# Patient Record
Sex: FEMALE | Race: WHITE | NOT HISPANIC OR LATINO | Employment: UNEMPLOYED | URBAN - METROPOLITAN AREA
[De-identification: names, ages, dates, MRNs, and addresses within clinical notes are randomized per-mention and may not be internally consistent; named-entity substitution may affect disease eponyms.]

---

## 2017-02-15 ENCOUNTER — GENERIC CONVERSION - ENCOUNTER (OUTPATIENT)
Dept: OTHER | Facility: OTHER | Age: 47
End: 2017-02-15

## 2017-04-25 ENCOUNTER — GENERIC CONVERSION - ENCOUNTER (OUTPATIENT)
Dept: OTHER | Facility: OTHER | Age: 47
End: 2017-04-25

## 2017-04-25 ENCOUNTER — HOSPITAL ENCOUNTER (OUTPATIENT)
Dept: RADIOLOGY | Facility: CLINIC | Age: 47
Discharge: HOME | End: 2017-04-25

## 2017-04-25 DIAGNOSIS — M70.71 OTHER BURSITIS OF HIP, RIGHT HIP: ICD-10-CM

## 2017-04-25 DIAGNOSIS — M76.31 ILIOTIBIAL BAND SYNDROME OF RIGHT SIDE: ICD-10-CM

## 2017-04-25 DIAGNOSIS — M54.9 DORSALGIA: ICD-10-CM

## 2017-04-25 DIAGNOSIS — G57.01 LESION OF RIGHT SCIATIC NERVE: ICD-10-CM

## 2017-04-25 DIAGNOSIS — M53.3 SACROCOCCYGEAL DISORDERS, NOT ELSEWHERE CLASSIFIED: ICD-10-CM

## 2017-04-25 PROCEDURE — 73502 X-RAY EXAM HIP UNI 2-3 VIEWS: CPT

## 2017-04-25 PROCEDURE — 72100 X-RAY EXAM L-S SPINE 2/3 VWS: CPT

## 2017-05-09 ENCOUNTER — GENERIC CONVERSION - ENCOUNTER (OUTPATIENT)
Dept: OTHER | Facility: OTHER | Age: 47
End: 2017-05-09

## 2017-05-09 ENCOUNTER — APPOINTMENT (OUTPATIENT)
Dept: PHYSICAL THERAPY | Facility: REHABILITATION | Age: 47
End: 2017-05-09
Payer: COMMERCIAL

## 2017-05-09 DIAGNOSIS — G57.01 LESION OF RIGHT SCIATIC NERVE: ICD-10-CM

## 2017-05-09 DIAGNOSIS — M53.3 SACROCOCCYGEAL DISORDERS, NOT ELSEWHERE CLASSIFIED: ICD-10-CM

## 2017-05-09 DIAGNOSIS — M70.71 OTHER BURSITIS OF HIP, RIGHT HIP: ICD-10-CM

## 2017-05-09 DIAGNOSIS — M76.31 ILIOTIBIAL BAND SYNDROME OF RIGHT SIDE: ICD-10-CM

## 2017-05-09 PROCEDURE — 97162 PT EVAL MOD COMPLEX 30 MIN: CPT

## 2017-05-09 PROCEDURE — 97110 THERAPEUTIC EXERCISES: CPT

## 2017-05-11 ENCOUNTER — APPOINTMENT (OUTPATIENT)
Dept: PHYSICAL THERAPY | Facility: REHABILITATION | Age: 47
End: 2017-05-11
Payer: COMMERCIAL

## 2017-05-11 PROCEDURE — 97014 ELECTRIC STIMULATION THERAPY: CPT

## 2017-05-11 PROCEDURE — 97110 THERAPEUTIC EXERCISES: CPT

## 2017-05-11 PROCEDURE — 97140 MANUAL THERAPY 1/> REGIONS: CPT

## 2017-05-16 ENCOUNTER — APPOINTMENT (OUTPATIENT)
Dept: PHYSICAL THERAPY | Facility: REHABILITATION | Age: 47
End: 2017-05-16
Payer: COMMERCIAL

## 2017-05-16 PROCEDURE — 97110 THERAPEUTIC EXERCISES: CPT

## 2017-05-16 PROCEDURE — 97140 MANUAL THERAPY 1/> REGIONS: CPT

## 2017-05-16 PROCEDURE — 97014 ELECTRIC STIMULATION THERAPY: CPT

## 2017-05-18 ENCOUNTER — ALLSCRIPTS OFFICE VISIT (OUTPATIENT)
Dept: OTHER | Facility: OTHER | Age: 47
End: 2017-05-18

## 2017-05-23 ENCOUNTER — APPOINTMENT (OUTPATIENT)
Dept: PHYSICAL THERAPY | Facility: REHABILITATION | Age: 47
End: 2017-05-23
Payer: COMMERCIAL

## 2017-05-23 PROCEDURE — 97140 MANUAL THERAPY 1/> REGIONS: CPT

## 2017-05-23 PROCEDURE — 97110 THERAPEUTIC EXERCISES: CPT

## 2017-05-23 PROCEDURE — 97014 ELECTRIC STIMULATION THERAPY: CPT

## 2017-05-25 ENCOUNTER — APPOINTMENT (OUTPATIENT)
Dept: PHYSICAL THERAPY | Facility: REHABILITATION | Age: 47
End: 2017-05-25
Payer: COMMERCIAL

## 2017-05-25 PROCEDURE — 97014 ELECTRIC STIMULATION THERAPY: CPT

## 2017-05-25 PROCEDURE — 97140 MANUAL THERAPY 1/> REGIONS: CPT

## 2017-05-25 PROCEDURE — 97110 THERAPEUTIC EXERCISES: CPT

## 2017-05-30 ENCOUNTER — APPOINTMENT (OUTPATIENT)
Dept: PHYSICAL THERAPY | Facility: REHABILITATION | Age: 47
End: 2017-05-30
Payer: COMMERCIAL

## 2018-01-09 NOTE — PROGRESS NOTES
Assessment    1  Shoulder tendonitis (726 10) (M75 80)   2  Tendinitis of right shoulder (726 10) (M75 81)   3  Bursitis of hip (726 5) (M70 70)   4  Bursitis of right hip (726 5) (M70 71)    Plan  Bursitis of right hip, Tendinitis of right shoulder    · Meloxicam 7 5 MG Oral Tablet; TAKE 1 TABLET TWICE DAILY WITH FOOD    Discussion/Summary    Discussed diagnostic and treatment options with patient  Will hold off on x-rays at this time as no trauma involved  Patient be started on meloxicam 7 5 mg one twice a day with food and instructed to apply ice for 20 minutes 2-3 times daily  Recommend patient discontinue bowling for the next 2 weeks and allow these areas to rest  Patient return the office in 2 weeks or sooner when necessary  If symptoms persist discussed x-ray and referral to orthopedics and/or physical therapy  Possible side effects of new medications were reviewed with the patient/guardian today  The treatment plan was reviewed with the patient/guardian  The patient/guardian understands and agrees with the treatment plan      Chief Complaint    1  Arm Pain  Right Arm Pain  x 2 weeks      History of Present Illness  HPI: Patient started 2 weeks ago with right shoulder and right hip pain after bowling  Patient bowls weekly and complained of recurrent pain last week after bowling again  She denies any specific injury or fall  Patient treated this with Aleve without significant relief  Arm Pain:   Mari Mcmullen presents with complaints of intermittent episodes of right shoulder pain in the arms, described as aching, radiating to the right upper arm and right elbow  Associated symptoms include no swelling, no redness, no ecchymosis, no instability, no decreased range of motion, no numbness, no tingling and no weakness  Mari Mcmullen presents with complaints of shoulder symptoms  Hip Pain: The patient is being seen for an initial evaluation of hip pain   Symptoms:  no thigh pain, no localized bruising, no localized swelling, no localized redness, no localized warmth and no limping    The patient presents with complaints of occasional episodes of right hip pain, described as sharp, radiating to the right groin  The patient is currently experiencing symptoms  Associated symptoms:  no fever, no chills, no back pain and no rash  Active Problems    1  ACL tear (844 2) (S83 519A)   2  Acute sinusitis (461 9) (J01 90)   3  Allergic rhinitis (477 9) (J30 9)   4  Anxiety (300 00) (F41 9)   5  Cough (786 2) (R05)   6  Depression (311) (F32 9)   7  Esophageal reflux (530 81) (K21 9)   8  Headache (784 0) (R51)   9  Insect bite of leg (916 4,E906 4) (A46 667K,Q10  XXXA)   10  Joint pain, knee (719 46) (M25 569)   11  Neck pain (723 1) (M54 2)   12  Neck Strain (847 0)   13  Patellar tendonitis, unspecified laterality (726 64) (M76 50)   14  Sprained Anterior Cruciate Ligament Of The Knee (844 2)   15  Sprained Right Knee (844 9)   16  Tendinitis of shoulder, unspecified laterality (726 10) (M75 80)    Past Medical History    1  Diabetes Mellitus (250 00)    Family History    1  Family history of Diabetes Mellitus (V18 0)    2  Family history of Lung Cancer (V16 1)    3  Family history of Stroke Syndrome (V17 1)    4  Family history of Gastric Cancer (V16 0)    5  Family history of Lung Cancer (V16 1)    Social History    · Being A Social Drinker   · Daily Coffee Consumption (2  Cups/Day)   · Never A Smoker    Current Meds   1  ALPRAZolam ER 0 5 MG Oral Tablet Extended Release 24 Hour; TAKE 1 TABLET DAILY   AS DIRECTED; Therapy: 30Apr2013 to (Evaluate:06Dqo0816); Last Rx:30Apr2013 Ordered   2  Calcium 600 + D TABS; Take 1 tablet twice daily; Therapy: (Recorded:49Wgp3686) to Recorded   3  [facility] Vitamin D3 1000 UNIT CAPS; Therapy: (Mia Franks) to Recorded   4  Dymista 137-50 MCG/ACT Nasal Suspension; Therapy: [id number] to Recorded   5  Fish Oil 1200 MG Oral Capsule; TAKE AS DIRECTED;    Therapy: (Recorded:28Sjc1645) to Recorded   6  Pantoprazole Sodium 40 MG Oral Tablet Delayed Release; TAKE 1 TABLET TWICE   DAILY; Therapy: ([facility]) to Recorded   7  Sucralfate 1 GM Oral Tablet; TAKE TWICE A DAY AS DIRECTED; Therapy: ([facility]) to Recorded   8  Zenchent 0 4-35 MG-MCG Oral Tablet; TAKE 1 TABLET DAILY AS DIRECTED; Therapy: (Recorded:76Vcx1820) to Recorded    Allergies    1  No Known Drug Allergies    Vitals   Recorded: 33QES7022 05:08PM Recorded: 30NUS0346 04:52PM   Temperature  97 7 F   Heart Rate 76    Respiration 16    Systolic  856   Diastolic  90   Height  5 ft 1 in   Weight  188 lb    BMI Calculated  35 52   BSA Calculated  1 84     Physical Exam    Constitutional   General appearance: No acute distress, well appearing and well nourished  Eyes   Conjunctiva and lids: No swelling, erythema or discharge  Pulmonary   Respiratory effort: No increased work of breathing or signs of respiratory distress  Auscultation of lungs: Clear to auscultation  Cardiovascular   Auscultation of heart: Normal rate and rhythm, normal S1 and S2, without murmurs  Examination of extremities for edema and/or varicosities: Normal     Abdomen   Abdomen: Non-tender, no masses  Lymphatic   Palpation of lymph nodes in neck: No lymphadenopathy  Musculoskeletal   Gait and station: Normal     Inspection/palpation of joints, bones, and muscles: Abnormal   Right shoulder reveals full range of motion intact  Positive tenderness along the anterior aspect of the shoulder and along the biceps tendon  Right hip reveals full range of motion intact  Negative straight leg raise and negative Osmin tenderness  Positive tenderness along the lateral aspect of the hip at the greater trochanter  Skin   Skin and subcutaneous tissue: Normal without rashes or lesions      Psychiatric   Orientation to person, place, and time: Normal     Mood and affect: Normal          Signatures   Electronically signed by : DEBBIE Sahni DO; Jan 11 2016  5:18PM EST                       (Author)

## 2018-01-14 VITALS
BODY MASS INDEX: 34.55 KG/M2 | SYSTOLIC BLOOD PRESSURE: 125 MMHG | HEART RATE: 79 BPM | HEIGHT: 61 IN | DIASTOLIC BLOOD PRESSURE: 78 MMHG | WEIGHT: 183 LBS

## 2018-01-17 NOTE — PROGRESS NOTES
Assessment    1  Hypertension (401 9) (I10)   2  Tendinitis of right shoulder (726 10) (H39 81)    Plan  Hypertension    · Lisinopril 10 MG Oral Tablet; TAKE 1 TABLET DAILY  Tendinitis of right shoulder    · 1 Kamryn Carlin MD, Gladys Yun (Orthopedic Surgery) Physician Referral  Consult  Status:  Hold For - Scheduling  Requested for: [id number]  Care Summary provided  : Yes    Discussion/Summary    Discussed treatment options with patient  Patient will get labs as per gynecologist including thyroid studies  Patient restart on lisinopril 10 mg one daily  Patient instructed to follow a low-fat, low-salt and a low carbohydrate diet and get regular exercise walking 4-5 times a week for up to 30 minutes as tolerated  Patient to monitor blood pressure at home  Patient being referred to Dr Andrew Haley, orthopedic surgeon and instructed to discontinue meloxicam  Patient return the office in 4-6 weeks for blood pressure recheck  Possible side effects of new medications were reviewed with the patient/guardian today  The treatment plan was reviewed with the patient/guardian  The patient/guardian understands and agrees with the treatment plan      Chief Complaint  Elevated BP      History of Present Illness  HPI: Patient is concerned about elevated blood pressure at her gynecologist office 3 days ago and over the weekend at home in the range of 150/100  Patient denies any symptoms  She denies headache lightheaded or dizziness  Patient denies any chest pain, shortness of breath, palpitations or leg swelling  Patient admits to not exercising regularly and weight gain  Patient admits to family history of hypertension  Patient's blood work ordered by her gynecologist  Patient complains of continued right shoulder pain with no significant relief with meloxicam    Hypertension: The patient is being seen for an initial evaluation of essential hypertension   Symptoms:  no headache, no confusion, no visual disturbance, no dizziness and no shortness of breath  The patient is currently experiencing symptoms  Exacerbating factors:  weight gain, but not exacerbated by stress and not exacerbated by fatigue  Associated symptoms:  no chest pain, no claudication, no near syncope, no syncope, no weakness, no paresthesias and no edema  Active Problems    1  ACL tear (844 2) (S83 519A)   2  Acute sinusitis (461 9) (J01 90)   3  Allergic rhinitis (477 9) (J30 9)   4  Anxiety (300 00) (F41 9)   5  Bursitis of hip (726 5) (M70 70)   6  Bursitis of right hip (726 5) (M70 71)   7  Cough (786 2) (R05)   8  Depression (311) (F32 9)   9  Esophageal reflux (530 81) (K21 9)   10  Headache (784 0) (R51)   11  Insect bite of leg (916 4,E906 4) (S33 468S,C51  XXXA)   12  Joint pain, knee (719 46) (M25 569)   13  Neck pain (723 1) (M54 2)   14  Neck Strain (847 0)   15  Patellar tendonitis, unspecified laterality (726 64) (M76 50)   16  Shoulder tendonitis (726 10) (M75 80)   17  Sprained Anterior Cruciate Ligament Of The Knee (844 2)   18  Sprained Right Knee (844 9)   19  Tendinitis of right shoulder (726 10) (M75 81)   20  Tendinitis of shoulder, unspecified laterality (726 10) (M75 80)    Past Medical History    1  Diabetes Mellitus (250 00)    Family History    1  Family history of Diabetes Mellitus (V18 0)    2  Family history of Lung Cancer (V16 1)    3  Family history of Stroke Syndrome (V17 1)    4  Family history of Gastric Cancer (V16 0)    5  Family history of Lung Cancer (V16 1)    Social History    · Being A Social Drinker   · Daily Coffee Consumption (2  Cups/Day)   · Never A Smoker    Current Meds   1  ALPRAZolam ER 0 5 MG Oral Tablet Extended Release 24 Hour; TAKE 1 TABLET DAILY   AS DIRECTED; Therapy: 30Apr2013 to (Evaluate:03Pau1539); Last Rx:30Apr2013 Ordered   2  Calcium 600 + D TABS; Take 1 tablet twice daily; Therapy: (Recorded:88Eir9215) to Recorded   3  [employer] Vitamin D3 1000 UNIT CAPS; Therapy: (Naomi Robertson) to Recorded   4   [facility] 137-50 MCG/ACT Nasal Suspension; Therapy: [id number] to Recorded   5  Fish Oil 1200 MG Oral Capsule; TAKE AS DIRECTED; Therapy: (Recorded:21Beo7796) to Recorded   6  Meloxicam 7 5 MG Oral Tablet; TAKE 1 TABLET TWICE DAILY WITH FOOD; Therapy: 58RRD2269 to (Evaluate:43Qaz8264)  Requested for: 00ETO7882; Last   Rx:11Jan2016 Ordered   7  Pantoprazole Sodium 40 MG Oral Tablet Delayed Release; TAKE 1 TABLET TWICE   DAILY; Therapy: (Artur Jefferson) to Recorded   8  Sucralfate 1 GM Oral Tablet; TAKE TWICE A DAY AS DIRECTED; Therapy: (Recorded:28Jan2014) to Recorded    Allergies    1  No Known Drug Allergies    Vitals   Recorded: 20EQQ1715 05:33PM Recorded: 28DYD0633 05:09PM   Temperature  99 1 F   Heart Rate 76    Respiration 16    Systolic 517 244   Diastolic 98 140   Height  5 ft 1 in   Weight  187 lb    BMI Calculated  35 33   BSA Calculated  1 84     Physical Exam    Constitutional   General appearance: No acute distress, well appearing and well nourished  Eyes   Conjunctiva and lids: No swelling, erythema or discharge  Ears, Nose, Mouth, and Throat   External inspection of ears and nose: Normal     Otoscopic examination: Tympanic membranes translucent with normal light reflex  Canals patent without erythema  Nasal mucosa, septum, and turbinates: Normal without edema or erythema  Oropharynx: Normal with no erythema, edema, exudate or lesions  Pulmonary   Respiratory effort: No increased work of breathing or signs of respiratory distress  Auscultation of lungs: Clear to auscultation  Cardiovascular   Auscultation of heart: Normal rate and rhythm, normal S1 and S2, without murmurs  Examination of extremities for edema and/or varicosities: Normal     Carotid pulses: Normal     Abdomen   Abdomen: Non-tender, no masses  Lymphatic   Palpation of lymph nodes in neck: No lymphadenopathy  Negative thyroid tenderness     Musculoskeletal   Gait and station: Normal     Inspection/palpation of joints, bones, and muscles: Abnormal   Right shoulder range of motion intact  Positive tenderness along biceps tendon  Skin   Skin and subcutaneous tissue: Normal without rashes or lesions      Psychiatric   Orientation to person, place, and time: Normal     Mood and affect: Normal          Signatures   Electronically signed by : DEBBIE Guerin DO; Feb 1 2016  5:44PM EST                       (Author)

## 2018-01-22 VITALS
DIASTOLIC BLOOD PRESSURE: 73 MMHG | HEART RATE: 91 BPM | BODY MASS INDEX: 34.55 KG/M2 | WEIGHT: 183 LBS | SYSTOLIC BLOOD PRESSURE: 117 MMHG | HEIGHT: 61 IN

## 2018-02-16 PROCEDURE — 87624 HPV HI-RISK TYP POOLED RSLT: CPT | Performed by: OBSTETRICS & GYNECOLOGY

## 2018-02-16 PROCEDURE — G0145 SCR C/V CYTO,THINLAYER,RESCR: HCPCS | Performed by: OBSTETRICS & GYNECOLOGY

## 2018-02-17 ENCOUNTER — LAB REQUISITION (OUTPATIENT)
Dept: LAB | Facility: HOSPITAL | Age: 48
End: 2018-02-17
Payer: COMMERCIAL

## 2018-02-17 DIAGNOSIS — Z11.51 ENCOUNTER FOR SCREENING FOR HUMAN PAPILLOMAVIRUS (HPV): ICD-10-CM

## 2018-02-17 DIAGNOSIS — Z01.419 ENCOUNTER FOR GYNECOLOGICAL EXAMINATION WITHOUT ABNORMAL FINDING: ICD-10-CM

## 2018-02-19 LAB — HPV RRNA GENITAL QL NAA+PROBE: NORMAL

## 2018-02-22 LAB
LAB AP GYN PRIMARY INTERPRETATION: NORMAL
LAB AP LMP: NORMAL
Lab: NORMAL

## 2018-03-25 DIAGNOSIS — I10 ESSENTIAL HYPERTENSION: Primary | ICD-10-CM

## 2018-03-25 RX ORDER — AMLODIPINE BESYLATE 5 MG/1
TABLET ORAL
Qty: 90 TABLET | Refills: 0 | Status: SHIPPED | OUTPATIENT
Start: 2018-03-25 | End: 2018-03-26 | Stop reason: SDUPTHER

## 2018-03-26 DIAGNOSIS — I10 ESSENTIAL HYPERTENSION: ICD-10-CM

## 2018-03-26 RX ORDER — AMLODIPINE BESYLATE 5 MG/1
5 TABLET ORAL DAILY
Qty: 90 TABLET | Refills: 3 | Status: SHIPPED | OUTPATIENT
Start: 2018-03-26 | End: 2019-05-31 | Stop reason: SDUPTHER

## 2018-05-20 ENCOUNTER — OFFICE VISIT (OUTPATIENT)
Dept: URGENT CARE | Age: 48
End: 2018-05-20
Payer: COMMERCIAL

## 2018-05-20 VITALS
HEIGHT: 61 IN | HEART RATE: 83 BPM | RESPIRATION RATE: 16 BRPM | OXYGEN SATURATION: 96 % | DIASTOLIC BLOOD PRESSURE: 76 MMHG | WEIGHT: 177 LBS | BODY MASS INDEX: 33.42 KG/M2 | SYSTOLIC BLOOD PRESSURE: 122 MMHG | TEMPERATURE: 99.7 F

## 2018-05-20 DIAGNOSIS — J20.9 ACUTE BRONCHITIS, UNSPECIFIED ORGANISM: Primary | ICD-10-CM

## 2018-05-20 PROCEDURE — 99213 OFFICE O/P EST LOW 20 MIN: CPT | Performed by: PHYSICIAN ASSISTANT

## 2018-05-20 RX ORDER — SUCRALFATE 1 G/1
TABLET ORAL
COMMUNITY
End: 2020-12-01 | Stop reason: SDUPTHER

## 2018-05-20 RX ORDER — PANTOPRAZOLE SODIUM 40 MG/1
1 TABLET, DELAYED RELEASE ORAL 2 TIMES DAILY
COMMUNITY
End: 2020-09-11 | Stop reason: SDUPTHER

## 2018-05-20 RX ORDER — AMOXICILLIN 500 MG
CAPSULE ORAL
COMMUNITY

## 2018-05-20 RX ORDER — AZELASTINE HYDROCHLORIDE, FLUTICASONE PROPIONATE 137; 50 UG/1; UG/1
SPRAY, METERED NASAL
COMMUNITY
Start: 2013-12-05 | End: 2018-05-30

## 2018-05-20 RX ORDER — AZITHROMYCIN 250 MG/1
TABLET, FILM COATED ORAL
Qty: 6 TABLET | Refills: 0 | Status: SHIPPED | OUTPATIENT
Start: 2018-05-20 | End: 2018-05-24

## 2018-05-20 RX ORDER — NORETHINDRONE ACETATE AND ETHINYL ESTRADIOL, ETHINYL ESTRADIOL AND FERROUS FUMARATE 1MG-10(24)
KIT ORAL
COMMUNITY
Start: 2018-02-28 | End: 2019-03-08 | Stop reason: SDUPTHER

## 2018-05-20 RX ORDER — ALPRAZOLAM 0.5 MG/1
1 TABLET, EXTENDED RELEASE ORAL DAILY
COMMUNITY
Start: 2013-04-30 | End: 2018-11-26 | Stop reason: SDUPTHER

## 2018-05-20 RX ORDER — GUAIFENESIN 600 MG
1200 TABLET, EXTENDED RELEASE 12 HR ORAL EVERY 12 HOURS SCHEDULED
Qty: 8 TABLET | Refills: 0 | Status: SHIPPED | OUTPATIENT
Start: 2018-05-20 | End: 2018-05-30

## 2018-05-20 RX ORDER — ALBUTEROL SULFATE 90 UG/1
2 AEROSOL, METERED RESPIRATORY (INHALATION) EVERY 6 HOURS PRN
Qty: 1 INHALER | Refills: 0 | Status: SHIPPED | OUTPATIENT
Start: 2018-05-20 | End: 2018-11-14

## 2018-05-20 RX ORDER — B-COMPLEX WITH VITAMIN C
1 TABLET ORAL 2 TIMES DAILY
COMMUNITY

## 2018-05-20 NOTE — PATIENT INSTRUCTIONS
Take medications as prescribed  Go to family doctor if symptoms persist   Go to ER immediately if new or worsening symptoms occur  Acute Bronchitis   WHAT YOU NEED TO KNOW:   Acute bronchitis is swelling and irritation in the air passages of your lungs  This irritation may cause you to cough or have other breathing problems  Acute bronchitis often starts because of another illness, such as a cold or the flu  The illness spreads from your nose and throat to your windpipe and airways  Bronchitis is often called a chest cold  Acute bronchitis lasts about 3 to 6 weeks and is usually not a serious illness  Your cough can last for several weeks  DISCHARGE INSTRUCTIONS:   Return to the emergency department if:   · You cough up blood  · Your lips or fingernails turn blue  · You feel like you are not getting enough air when you breathe  Contact your healthcare provider if:   · You have a fever  · Your breathing problems do not go away or get worse  · Your cough does not get better within 4 weeks  · You have questions or concerns about your condition or care  Self-care:   · Get more rest   Rest helps your body to heal  Slowly start to do more each day  Rest when you feel it is needed  · Avoid irritants in the air  Avoid chemicals, fumes, and dust  Wear a face mask if you must work around dust or fumes  Stay inside on days when air pollution levels are high  If you have allergies, stay inside when pollen counts are high  Do not use aerosol products, such as spray-on deodorant, bug spray, and hair spray  · Do not smoke or be around others who smoke  Nicotine and other chemicals in cigarettes and cigars damages the cilia that move mucus out of your lungs  Ask your healthcare provider for information if you currently smoke and need help to quit  E-cigarettes or smokeless tobacco still contain nicotine  Talk to your healthcare provider before you use these products  · Drink liquids as directed  Liquids help keep your air passages moist and help you cough up mucus  You may need to drink more liquids when you have acute bronchitis  Ask how much liquid to drink each day and which liquids are best for you  · Use a humidifier or vaporizer  Use a cool mist humidifier or a vaporizer to increase air moisture in your home  This may make it easier for you to breathe and help decrease your cough  Decrease risk for acute bronchitis:   · Get the vaccinations you need  Ask your healthcare provider if you should get vaccinated against the flu or pneumonia  · Prevent the spread of germs  You can decrease your risk of acute bronchitis and other illnesses by doing the following:     [facility] your hands often with soap and water  Carry germ-killing hand lotion or gel with you  You can use the lotion or gel to clean your hands when soap and water are not available  ¨ Do not touch your eyes, nose, or mouth unless you have washed your hands first     ¨ Always cover your mouth when you cough to prevent the spread of germs  It is best to cough into a tissue or your shirt sleeve instead of into your hand  Ask those around you cover their mouths when they cough  ¨ Try to avoid people who have a cold or the flu  If you are sick, stay away from others as much as possible  Medicines: Your healthcare provider may  give you any of the following:  · Ibuprofen or acetaminophen  are medicines that help lower your fever  They are available without a doctor's order  Ask your healthcare provider which medicine is right for you  Ask how much to take and how often to take it  Follow directions  These medicines can cause stomach bleeding if not taken correctly  Ibuprofen can cause kidney damage  Do not take ibuprofen if you have kidney disease, an ulcer, or allergies to aspirin  Acetaminophen can cause liver damage  Do not take more than 4,000 milligrams in 24 hours       · Decongestants  help loosen mucus in your lungs and make it easier to cough up  This can help you breathe easier  · Cough suppressants  decrease your urge to cough  If your cough produces mucus, do not take a cough suppressant unless your healthcare provider tells you to  Your healthcare provider may suggest that you take a cough suppressant at night so you can rest     · Inhalers  may be given  Your healthcare provider may give you one or more inhalers to help you breathe easier and cough less  An inhaler gives your medicine to open your airways  Ask your healthcare provider to show you how to use your inhaler correctly  · Take your medicine as directed  Contact your healthcare provider if you think your medicine is not helping or if you have side effects  Tell him of her if you are allergic to any medicine  Keep a list of the medicines, vitamins, and herbs you take  Include the amounts, and when and why you take them  Bring the list or the pill bottles to follow-up visits  Carry your medicine list with you in case of an emergency  Follow up with your healthcare provider as directed:  Write down questions you have so you will remember to ask them during your follow-up visits  © 2017 [employer] Information is for End User's use only and may not be sold, redistributed or otherwise used for commercial purposes  All illustrations and images included in CareNotes® are the copyrighted property of [employer]  or Abhijeet Tarango  The above information is an  only  It is not intended as medical advice for individual conditions or treatments  Talk to your doctor, nurse or pharmacist before following any medical regimen to see if it is safe and effective for you

## 2018-05-20 NOTE — PROGRESS NOTES
[facility]        NAME: Ganesh Woods is a 49 y o  female  : 1970    MRN: [id number]  DATE: May 20, 2018  TIME: 9:55 AM    Assessment and Plan   Acute bronchitis, unspecified organism [J20 9]  1  Acute bronchitis, unspecified organism  azithromycin (ZITHROMAX) 250 mg tablet    guaiFENesin (MUCINEX) 600 mg 12 hr tablet    albuterol (PROVENTIL HFA) 90 mcg/act inhaler         Patient Instructions       Take medications as prescribed  Go to family doctor if symptoms persist   Go to ER immediately if new or worsening symptoms occur  Chief Complaint     Chief Complaint   Patient presents with    Cold Like Symptoms     Pt reports chest congestion x1 week  She reports a cough that began Wednesday  Denies fevers  History of Present Illness       Cough   This is a new problem  Episode onset: 6 days ago  The problem has been gradually worsening  The problem occurs constantly  The cough is productive of brown sputum  Associated symptoms include nasal congestion, postnasal drip and rhinorrhea  Pertinent negatives include no chest pain, chills, ear congestion, ear pain, fever, headaches, hemoptysis, myalgias, rash, sore throat, shortness of breath, sweats, weight loss or wheezing  Exacerbated by: Deep breaths  She has tried nothing for the symptoms  The treatment provided no relief  Review of Systems   Review of Systems   Constitutional: Negative for chills, diaphoresis, fatigue, fever and weight loss  HENT: Positive for postnasal drip and rhinorrhea  Negative for congestion, ear pain, sinus pain, sinus pressure, sore throat and voice change  Eyes: Negative  Respiratory: Positive for cough  Negative for hemoptysis, chest tightness, shortness of breath and wheezing  Cardiovascular: Negative for chest pain and palpitations  Gastrointestinal: Negative for diarrhea, nausea and vomiting  Endocrine: Negative  Genitourinary: Negative for dysuria     Musculoskeletal: Negative for back pain, myalgias and neck pain  Skin: Negative for pallor and rash  Allergic/Immunologic: Negative  Neurological: Negative for dizziness, syncope and headaches  Hematological: Negative  Psychiatric/Behavioral: Negative  Current Medications       Current Outpatient Prescriptions:     ALPRAZolam ER (XANAX XR) 0 5 MG 24 hr tablet, Take 1 tablet by mouth daily, Disp: , Rfl:     Azelastine-Fluticasone (DYMISTA) 137-50 MCG/ACT SUSP, into each nostril, Disp: , Rfl:     albuterol (PROVENTIL HFA) 90 mcg/act inhaler, Inhale 2 puffs every 6 (six) hours as needed for wheezing or shortness of breath, Disp: 1 Inhaler, Rfl: 0    amLODIPine (NORVASC) 5 mg tablet, Take 1 tablet (5 mg total) by mouth daily, Disp: 90 tablet, Rfl: 3    azithromycin (ZITHROMAX) 250 mg tablet, Take 2 tablets today then 1 tablet daily x 4 days, Disp: 6 tablet, Rfl: 0    Calcium Carbonate-Vitamin D (CALCIUM 600+D) 600-200 MG-UNIT TABS, Take 1 tablet by mouth 2 (two) times a day, Disp: , Rfl:     Cholecalciferol ([employer]S VITAMIN D3) 1000 units capsule, Take by mouth, Disp: , Rfl:     guaiFENesin (MUCINEX) 600 mg 12 hr tablet, Take 2 tablets (1,200 mg total) by mouth every 12 (twelve) hours, Disp: 8 tablet, Rfl: 0    LO LOESTRIN FE 1 MG-10 MCG / 10 MCG TABS, , Disp: , Rfl:     Omega-3 Fatty Acids (FISH OIL) 1200 MG CAPS, Take by mouth, Disp: , Rfl:     pantoprazole (PROTONIX) 40 mg tablet, Take 1 tablet by mouth 2 (two) times a day, Disp: , Rfl:     sucralfate (CARAFATE) 1 g tablet, Take by mouth, Disp: , Rfl:     Current Allergies     Allergies as of 05/20/2018    (No Known Allergies)            The following portions of the patient's history were reviewed and updated as appropriate: allergies, current medications, past family history, past medical history, past social history, past surgical history and problem list      No past medical history on file  No past surgical history on file      No family history on file       Medications have been verified  Objective   /76   Pulse 83   Temp 99 7 °F (37 6 °C)   Resp 16   Ht 5' 1" (1 549 m)   Wt 80 3 kg (177 lb)   SpO2 96%   BMI 33 44 kg/m²        Physical Exam     Physical Exam   Constitutional: She is oriented to person, place, and time  She appears well-developed and well-nourished  No distress  HENT:   Head: Normocephalic and atraumatic  Right Ear: External ear normal    Left Ear: External ear normal    Mouth/Throat: No oropharyngeal exudate  Clear nasal dc b/l   PND  Erythematous posterior pharynx   Eyes: Conjunctivae and EOM are normal  Pupils are equal, round, and reactive to light  Right eye exhibits no discharge  Left eye exhibits no discharge  No scleral icterus  Neck: Normal range of motion  Neck supple  Cardiovascular: Normal rate, regular rhythm, normal heart sounds and intact distal pulses  Pulmonary/Chest: Effort normal and breath sounds normal  No respiratory distress  She has no wheezes  She has no rales  CTAB  Frequent dry cough   Musculoskeletal: She exhibits no edema or deformity  Neurological: She is alert and oriented to person, place, and time  Skin: Skin is warm  No rash noted  She is not diaphoretic  Nursing note and vitals reviewed

## 2018-05-30 ENCOUNTER — OFFICE VISIT (OUTPATIENT)
Dept: FAMILY MEDICINE CLINIC | Facility: CLINIC | Age: 48
End: 2018-05-30
Payer: COMMERCIAL

## 2018-05-30 VITALS
WEIGHT: 182 LBS | TEMPERATURE: 98.5 F | HEART RATE: 80 BPM | HEIGHT: 61 IN | OXYGEN SATURATION: 95 % | RESPIRATION RATE: 16 BRPM | SYSTOLIC BLOOD PRESSURE: 126 MMHG | DIASTOLIC BLOOD PRESSURE: 86 MMHG | BODY MASS INDEX: 34.36 KG/M2

## 2018-05-30 DIAGNOSIS — J20.9 ACUTE BRONCHITIS, UNSPECIFIED ORGANISM: Primary | ICD-10-CM

## 2018-05-30 PROCEDURE — 3008F BODY MASS INDEX DOCD: CPT | Performed by: FAMILY MEDICINE

## 2018-05-30 PROCEDURE — 99213 OFFICE O/P EST LOW 20 MIN: CPT | Performed by: FAMILY MEDICINE

## 2018-05-30 RX ORDER — LEVOFLOXACIN 500 MG/1
500 TABLET, FILM COATED ORAL EVERY 24 HOURS
Qty: 7 TABLET | Refills: 0 | Status: SHIPPED | OUTPATIENT
Start: 2018-05-30 | End: 2018-06-06

## 2018-05-30 RX ORDER — FLUTICASONE PROPIONATE 50 MCG
SPRAY, SUSPENSION (ML) NASAL DAILY
COMMUNITY

## 2018-05-30 NOTE — PROGRESS NOTES
Assessment/Plan:    Discussed diagnostic and treatment options with patient  Patient is being sent for chest x-ray PA and lateral, will heed results  Patient will be started on Levaquin 500 mg 1 daily for 7 days and Symbicort inhaler 160/4 5 mcg 2 puffs b i d  then rinse mouth  Patient instructed to take Mucinex DM p r n , increase fluids and rest   Return to the office in 1 week or sooner p r n  Honey Lewis Diagnoses and all orders for this visit:    Acute bronchitis, unspecified organism  Comments:  Chest x-ray  Levaquin 500 mg daily for 7 days  Symbicort inhaler 160/4 5 mcg 2 puffs b i d   Mucinex DM  Increase fluids and rest   Orders:  -     XR chest pa & lateral; Future  -     levofloxacin (LEVAQUIN) 500 mg tablet; Take 1 tablet (500 mg total) by mouth every 24 hours for 7 days    Other orders  -     fluticasone (FLONASE) 50 mcg/act nasal spray; into each nostril daily          Subjective:      Patient ID: Aftab Ozuna is a 49 y o  female  Patient is being seen in follow-up from an urgent care visit 10 days ago for bronchitis  Patient was treated with a Z-Mitul and Proventil inhaler without significant improvement  Patient states she developed a cough 2-3 weeks ago slightly productive of whitish mucus and occasional wheezing especially at night  Patient denies fever  URI    This is a new problem  The current episode started 1 to 4 weeks ago  There has been no fever  Associated symptoms include coughing, sneezing and wheezing  Pertinent negatives include no chest pain, congestion, ear pain, headaches, plugged ear sensation, rhinorrhea, sinus pain or sore throat  She has tried inhaler use for the symptoms  The treatment provided no relief         The following portions of the patient's history were reviewed and updated as appropriate: allergies, current medications, past family history, past medical history, past social history, past surgical history and problem list     Review of Systems   HENT: Positive for sneezing  Negative for congestion, ear pain, rhinorrhea, sinus pain and sore throat  Respiratory: Positive for cough and wheezing  Cardiovascular: Negative for chest pain  Neurological: Negative for headaches  Objective:      /86   Pulse 80   Temp 98 5 °F (36 9 °C)   Resp 16   Ht 5' 1" (1 549 m)   Wt 82 6 kg (182 lb)   SpO2 95%   BMI 34 39 kg/m²          Physical Exam   Constitutional: She is oriented to person, place, and time  She appears well-nourished  No distress  HENT:   Head: Normocephalic  Right Ear: External ear normal    Left Ear: External ear normal    Positive turbinates swelling with mucoid drainage  Throat with postnasal drainage  Eyes: Conjunctivae are normal  No scleral icterus  Neck: Neck supple  Cardiovascular: Normal rate and regular rhythm  Pulmonary/Chest: Effort normal and breath sounds normal  She has no wheezes  Abdominal: Soft  There is no tenderness  Musculoskeletal: She exhibits no edema  Lymphadenopathy:     She has no cervical adenopathy  Neurological: She is alert and oriented to person, place, and time  Skin: Skin is warm and dry  Psychiatric: She has a normal mood and affect

## 2018-05-31 ENCOUNTER — APPOINTMENT (OUTPATIENT)
Dept: RADIOLOGY | Age: 48
End: 2018-05-31
Payer: COMMERCIAL

## 2018-05-31 DIAGNOSIS — J20.9 ACUTE BRONCHITIS, UNSPECIFIED ORGANISM: ICD-10-CM

## 2018-05-31 PROCEDURE — 71046 X-RAY EXAM CHEST 2 VIEWS: CPT

## 2018-11-14 ENCOUNTER — OFFICE VISIT (OUTPATIENT)
Dept: FAMILY MEDICINE CLINIC | Facility: CLINIC | Age: 48
End: 2018-11-14
Payer: COMMERCIAL

## 2018-11-14 VITALS
TEMPERATURE: 98.6 F | BODY MASS INDEX: 35.3 KG/M2 | DIASTOLIC BLOOD PRESSURE: 70 MMHG | WEIGHT: 187 LBS | SYSTOLIC BLOOD PRESSURE: 104 MMHG | HEIGHT: 61 IN | RESPIRATION RATE: 16 BRPM | HEART RATE: 72 BPM

## 2018-11-14 DIAGNOSIS — J01.10 ACUTE FRONTAL SINUSITIS, RECURRENCE NOT SPECIFIED: Primary | ICD-10-CM

## 2018-11-14 PROCEDURE — 99213 OFFICE O/P EST LOW 20 MIN: CPT | Performed by: FAMILY MEDICINE

## 2018-11-14 RX ORDER — CEFUROXIME AXETIL 250 MG/1
250 TABLET ORAL EVERY 12 HOURS SCHEDULED
Qty: 20 TABLET | Refills: 0 | Status: SHIPPED | OUTPATIENT
Start: 2018-11-14 | End: 2018-11-24

## 2018-11-14 NOTE — PROGRESS NOTES
Assessment/Plan:    Patient will be started on Ceftin 250 mg b i d  for 10 days and continue Robitussin DM or Mucinex DM p r n   Increase fluids and rest   Return to the office in 1 week or sooner p r n  Maddi Schulz Diagnoses and all orders for this visit:    Acute frontal sinusitis, recurrence not specified  Comments:  Ceftin 250 mg 1 b i d  for 10 days  Robitussin DM or Mucinex DM p r n   Increase fluids and rest   Orders:  -     cefuroxime (CEFTIN) 250 mg tablet; Take 1 tablet (250 mg total) by mouth every 12 (twelve) hours for 10 days          Subjective:      Patient ID: Anita Born is a 51 y o  female  Patient started 3 weeks ago with sinus problems  She treated this with Tylenol and decongestant with some improvement until the past week complains of increased nasal congestion productive of yellow mucus, postnasal drainage and cough  She admits to sinus pressure headache  Patient denies fever  Sinus Problem   This is a new problem  The current episode started 1 to 4 weeks ago  The problem has been gradually worsening since onset  There has been no fever  Associated symptoms include congestion, coughing, headaches, a hoarse voice, sinus pressure and a sore throat  Pertinent negatives include no chills, ear pain, shortness of breath or sneezing  Past treatments include acetaminophen and oral decongestants (mucinex DM)  The treatment provided mild relief  The following portions of the patient's history were reviewed and updated as appropriate: allergies, current medications, past family history, past medical history, past social history, past surgical history and problem list     Review of Systems   Constitutional: Negative for chills  HENT: Positive for congestion, hoarse voice, sinus pressure and sore throat  Negative for ear pain and sneezing  Respiratory: Positive for cough  Negative for shortness of breath  Neurological: Positive for headaches           Objective:      /70 (BP Location: Left arm, Patient Position: Sitting, Cuff Size: Standard)   Pulse 72   Temp 98 6 °F (37 °C) (Tympanic)   Resp 16   Ht 5' 1" (1 549 m)   Wt 84 8 kg (187 lb)   BMI 35 33 kg/m²          Physical Exam   Constitutional: She is oriented to person, place, and time  She appears well-developed and well-nourished  HENT:   Head: Normocephalic  Right Ear: External ear normal    Left Ear: External ear normal    Positive turbinates swelling with mucoid drainage  Throat postnasal drainage and injected  Mucous membranes moist    Eyes: Conjunctivae are normal  No scleral icterus  Neck: Neck supple  Cardiovascular: Normal rate and regular rhythm  Pulmonary/Chest: Effort normal and breath sounds normal  She has no wheezes  Abdominal: Soft  There is no tenderness  Musculoskeletal: She exhibits no edema  Lymphadenopathy:     She has no cervical adenopathy  Neurological: She is alert and oriented to person, place, and time  Skin: Skin is warm and dry  Psychiatric: She has a normal mood and affect

## 2018-11-26 ENCOUNTER — OFFICE VISIT (OUTPATIENT)
Dept: FAMILY MEDICINE CLINIC | Facility: CLINIC | Age: 48
End: 2018-11-26
Payer: COMMERCIAL

## 2018-11-26 VITALS
BODY MASS INDEX: 35.12 KG/M2 | HEART RATE: 72 BPM | WEIGHT: 186 LBS | TEMPERATURE: 97.6 F | RESPIRATION RATE: 16 BRPM | SYSTOLIC BLOOD PRESSURE: 124 MMHG | HEIGHT: 61 IN | DIASTOLIC BLOOD PRESSURE: 82 MMHG

## 2018-11-26 DIAGNOSIS — F41.9 ANXIETY: ICD-10-CM

## 2018-11-26 DIAGNOSIS — J01.10 ACUTE FRONTAL SINUSITIS, RECURRENCE NOT SPECIFIED: Primary | ICD-10-CM

## 2018-11-26 PROCEDURE — 1036F TOBACCO NON-USER: CPT | Performed by: FAMILY MEDICINE

## 2018-11-26 PROCEDURE — 3008F BODY MASS INDEX DOCD: CPT | Performed by: FAMILY MEDICINE

## 2018-11-26 PROCEDURE — 99213 OFFICE O/P EST LOW 20 MIN: CPT | Performed by: FAMILY MEDICINE

## 2018-11-26 RX ORDER — ALPRAZOLAM 0.5 MG/1
0.5 TABLET, EXTENDED RELEASE ORAL DAILY PRN
Qty: 30 TABLET | Refills: 0 | Status: SHIPPED | OUTPATIENT
Start: 2018-11-26

## 2018-11-26 RX ORDER — AZITHROMYCIN 250 MG/1
TABLET, FILM COATED ORAL
Qty: 6 TABLET | Refills: 0 | Status: SHIPPED | OUTPATIENT
Start: 2018-11-26 | End: 2018-11-30

## 2018-11-26 NOTE — PROGRESS NOTES
Assessment/Plan:    Patient will be started on a Z-Mitul and recommend Robitussin DM or Mucinex DM p r n   Increase fluids and rest   Return to the office in 1 week or sooner p r n  Celestino Young Diagnoses and all orders for this visit:    Acute frontal sinusitis, recurrence not specified  Comments:  Z-Mitul and Mucinex or Robitussin p r n   Increase fluids and rest   Orders:  -     azithromycin (ZITHROMAX) 250 mg tablet; Take 2 tablets today then 1 tablet daily x 4 days    Anxiety  -     ALPRAZolam ER (XANAX XR) 0 5 MG 24 hr tablet; Take 1 tablet (0 5 mg total) by mouth daily as needed for anxiety          Subjective:      Patient ID: Harvey Wharton is a 51 y o  female  Patient recently completed a 10 day course of Ceftin for sinusitis with improvement in her symptoms until yesterday complains of recurrent sinus pressure headache, nasal congestion, postnasal drainage and cough  She denies fever  No treatment by patient  URI    This is a recurrent problem  The current episode started yesterday  The problem has been gradually worsening  There has been no fever  Associated symptoms include congestion, coughing, ear pain, headaches, rhinorrhea, sinus pain and sneezing  Pertinent negatives include no plugged ear sensation, sore throat or wheezing  Associated symptoms comments: pnd    She has tried increased fluids for the symptoms  The following portions of the patient's history were reviewed and updated as appropriate: allergies, current medications, past family history, past medical history, past social history, past surgical history and problem list     Review of Systems   HENT: Positive for congestion, ear pain, rhinorrhea, sinus pain and sneezing  Negative for sore throat  Respiratory: Positive for cough  Negative for wheezing  Neurological: Positive for headaches           Objective:      /82 (BP Location: Left arm, Patient Position: Sitting, Cuff Size: Standard)   Pulse 72   Temp 97 6 °F (36 4 °C) (Tympanic)   Resp 16   Ht 5' 1" (1 549 m)   Wt 84 4 kg (186 lb)   BMI 35 14 kg/m²          Physical Exam   Constitutional: She is oriented to person, place, and time  She appears well-developed and well-nourished  No distress  HENT:   Head: Normocephalic  Right Ear: External ear normal    Left Ear: External ear normal    Positive turbinates swelling with mucoid drainage  Throat postnasal drainage  Mucous membranes moist    Eyes: Conjunctivae are normal  No scleral icterus  Neck: Neck supple  Cardiovascular: Normal rate and regular rhythm  Pulmonary/Chest: Effort normal and breath sounds normal    Abdominal: Soft  There is no tenderness  Musculoskeletal: She exhibits no edema  Lymphadenopathy:     She has no cervical adenopathy  Neurological: She is alert and oriented to person, place, and time  Skin: Skin is warm and dry  Psychiatric: She has a normal mood and affect

## 2019-03-08 ENCOUNTER — ANNUAL EXAM (OUTPATIENT)
Dept: GYNECOLOGY | Facility: CLINIC | Age: 49
End: 2019-03-08
Payer: COMMERCIAL

## 2019-03-08 VITALS
SYSTOLIC BLOOD PRESSURE: 122 MMHG | WEIGHT: 191 LBS | RESPIRATION RATE: 17 BRPM | DIASTOLIC BLOOD PRESSURE: 74 MMHG | BODY MASS INDEX: 35.15 KG/M2 | TEMPERATURE: 98.3 F | HEIGHT: 62 IN

## 2019-03-08 DIAGNOSIS — Z01.419 ENCOUNTER FOR GYNECOLOGICAL EXAMINATION (GENERAL) (ROUTINE) WITHOUT ABNORMAL FINDINGS: Primary | ICD-10-CM

## 2019-03-08 DIAGNOSIS — Z12.31 ENCOUNTER FOR SCREENING MAMMOGRAM FOR MALIGNANT NEOPLASM OF BREAST: ICD-10-CM

## 2019-03-08 DIAGNOSIS — Z30.41 SURVEILLANCE OF CONTRACEPTIVE PILL: ICD-10-CM

## 2019-03-08 PROCEDURE — 99396 PREV VISIT EST AGE 40-64: CPT | Performed by: OBSTETRICS & GYNECOLOGY

## 2019-03-08 RX ORDER — NORETHINDRONE ACETATE AND ETHINYL ESTRADIOL, ETHINYL ESTRADIOL AND FERROUS FUMARATE 1MG-10(24)
1 KIT ORAL DAILY
Qty: 84 TABLET | Refills: 4 | Status: SHIPPED | OUTPATIENT
Start: 2019-03-08 | End: 2020-06-18 | Stop reason: SDUPTHER

## 2019-03-08 NOTE — PROGRESS NOTES
Assessment/Plan:       Diagnoses and all orders for this visit:    Encounter for gynecological examination (general) (routine) without abnormal findings    Encounter for screening mammogram for malignant neoplasm of breast  -     Mammo screening bilateral w 3d & cad; Future    Surveillance of contraceptive pill  -     LO LOESTRIN FE 1 MG-10 MCG / 10 MCG TABS; Take 1 tablet by mouth daily          Subjective:      Patient ID: Ken Fuentes is a 51 y o  female  The patient is a 53-year-old who presents for an annual gyn exam   She restarted oral contraceptives a year ago and has been amenorrheic on Lo Loestrin  She is happy with the amenorrhea  She would like to continue the birth control pills  She is not a smoker  She denies any breast or bladder problems  She has no dyspareunia  The patient had a normal Pap and HPV last year, therefore 1 will not be performed this year  The following portions of the patient's history were reviewed and updated as appropriate: allergies, current medications, past family history, past medical history, past social history, past surgical history and problem list     Review of Systems   Constitutional: Negative  Respiratory: Negative for shortness of breath  Cardiovascular: Negative for chest pain  Gastrointestinal: Negative  Genitourinary: Negative  Skin: Negative  Psychiatric/Behavioral: Negative for agitation, behavioral problems and confusion  Objective:      /74 (BP Location: Right arm, Patient Position: Sitting, Cuff Size: Standard)   Temp 98 3 °F (36 8 °C) (Tympanic)   Resp 17   Ht 5' 2" (1 575 m)   Wt 86 6 kg (191 lb)   BMI 34 93 kg/m²          Physical Exam   Constitutional: She appears well-developed and well-nourished  No distress  HENT:   Head: Normocephalic  Pulmonary/Chest: Effort normal  No respiratory distress  Right breast exhibits no inverted nipple, no mass, no nipple discharge, no skin change and no tenderness  Left breast exhibits no inverted nipple, no mass, no nipple discharge, no skin change and no tenderness  Breasts are symmetrical    Abdominal: She exhibits no distension and no mass  There is no tenderness  There is no rebound and no guarding  Genitourinary: Rectum normal and uterus normal  No labial fusion  There is no rash, tenderness, lesion or injury on the right labia  There is no rash, tenderness, lesion or injury on the left labia  Uterus is not tender  Cervix exhibits no motion tenderness, no discharge and no friability  Right adnexum displays no mass, no tenderness and no fullness  Left adnexum displays no mass, no tenderness and no fullness  No erythema, tenderness or bleeding in the vagina  No foreign body in the vagina  No signs of injury around the vagina  No vaginal discharge found  Lymphadenopathy:        Right axillary: No pectoral and no lateral adenopathy present  Left axillary: No pectoral and no lateral adenopathy present  Skin: Skin is warm, dry and intact  She is not diaphoretic  No cyanosis  Nails show no clubbing  Psychiatric: She has a normal mood and affect   Her speech is normal and behavior is normal

## 2019-03-11 DIAGNOSIS — Z12.31 ENCOUNTER FOR SCREENING MAMMOGRAM FOR MALIGNANT NEOPLASM OF BREAST: ICD-10-CM

## 2019-05-31 DIAGNOSIS — I10 ESSENTIAL HYPERTENSION: ICD-10-CM

## 2019-05-31 RX ORDER — AMLODIPINE BESYLATE 5 MG/1
TABLET ORAL
Qty: 90 TABLET | Refills: 3 | Status: SHIPPED | OUTPATIENT
Start: 2019-05-31 | End: 2020-05-14

## 2019-07-08 ENCOUNTER — OFFICE VISIT (OUTPATIENT)
Dept: URGENT CARE | Age: 49
End: 2019-07-08
Payer: COMMERCIAL

## 2019-07-08 VITALS
SYSTOLIC BLOOD PRESSURE: 132 MMHG | TEMPERATURE: 98.4 F | WEIGHT: 196 LBS | BODY MASS INDEX: 36.07 KG/M2 | HEIGHT: 62 IN | RESPIRATION RATE: 18 BRPM | DIASTOLIC BLOOD PRESSURE: 90 MMHG | HEART RATE: 92 BPM | OXYGEN SATURATION: 97 %

## 2019-07-08 DIAGNOSIS — J02.9 PHARYNGITIS, UNSPECIFIED ETIOLOGY: Primary | ICD-10-CM

## 2019-07-08 LAB — S PYO AG THROAT QL: NEGATIVE

## 2019-07-08 PROCEDURE — 87070 CULTURE OTHR SPECIMN AEROBIC: CPT

## 2019-07-08 PROCEDURE — 99213 OFFICE O/P EST LOW 20 MIN: CPT | Performed by: PHYSICIAN ASSISTANT

## 2019-07-08 PROCEDURE — 87880 STREP A ASSAY W/OPTIC: CPT

## 2019-07-08 RX ORDER — METHYLPREDNISOLONE 4 MG/1
TABLET ORAL
Qty: 1 EACH | Refills: 0 | Status: SHIPPED | OUTPATIENT
Start: 2019-07-08 | End: 2019-12-06

## 2019-07-08 NOTE — PATIENT INSTRUCTIONS
Take medrol dose abdirahman with food to prevent gi upset   May use tylenol as well  Recommended salt water gargles  Will call if culture comes back positive

## 2019-07-10 LAB — BACTERIA THROAT CULT: NORMAL

## 2019-12-06 ENCOUNTER — OFFICE VISIT (OUTPATIENT)
Dept: URGENT CARE | Age: 49
End: 2019-12-06

## 2019-12-06 VITALS
BODY MASS INDEX: 37.54 KG/M2 | HEART RATE: 84 BPM | SYSTOLIC BLOOD PRESSURE: 124 MMHG | DIASTOLIC BLOOD PRESSURE: 80 MMHG | OXYGEN SATURATION: 98 % | WEIGHT: 204 LBS | RESPIRATION RATE: 18 BRPM | HEIGHT: 62 IN | TEMPERATURE: 97.7 F

## 2019-12-06 DIAGNOSIS — J40 BRONCHITIS: Primary | ICD-10-CM

## 2019-12-06 RX ORDER — BENZONATATE 100 MG/1
100 CAPSULE ORAL 3 TIMES DAILY PRN
Qty: 20 CAPSULE | Refills: 0 | Status: SHIPPED | OUTPATIENT
Start: 2019-12-06 | End: 2020-08-04 | Stop reason: ALTCHOICE

## 2019-12-06 RX ORDER — ALBUTEROL SULFATE 90 UG/1
2 AEROSOL, METERED RESPIRATORY (INHALATION) EVERY 6 HOURS PRN
Qty: 8.5 G | Refills: 0 | Status: SHIPPED | OUTPATIENT
Start: 2019-12-06

## 2019-12-06 RX ORDER — AZITHROMYCIN 250 MG/1
TABLET, FILM COATED ORAL
Qty: 6 TABLET | Refills: 0 | Status: SHIPPED | OUTPATIENT
Start: 2019-12-06 | End: 2019-12-10

## 2019-12-06 NOTE — PROGRESS NOTES
[facility]        NAME: Ilene Jones is a 47 y o  female  : 1970    MRN: [id number]  DATE: 2019  TIME: 4:22 PM    /80   Pulse 84   Temp 97 7 °F (36 5 °C) (Tympanic)   Resp 18   Ht 5' 1 5" (1 562 m)   Wt 92 5 kg (204 lb)   SpO2 98%   BMI 37 92 kg/m²     Assessment and Plan   Bronchitis [J40]  1  Bronchitis  benzonatate (TESSALON PERLES) 100 mg capsule    albuterol (PROAIR HFA) 90 mcg/act inhaler    azithromycin (ZITHROMAX) 250 mg tablet         Patient Instructions       Follow up with PCP in 3-5 days  Proceed to  ER if symptoms worsen  Chief Complaint     Chief Complaint   Patient presents with    Cough     Pt c/o cough and congestion for 2 weeks  Denies fever  Pt has been taking Robitussin for symptoms  History of Present Illness       Pt with cough and congestion for 1 week    Cough   This is a new problem  The current episode started 1 to 4 weeks ago  The problem has been unchanged  The cough is non-productive  Pertinent negatives include no chest pain, chills, ear congestion, ear pain, fever, headaches, heartburn, hemoptysis, myalgias, nasal congestion, postnasal drip, rash, rhinorrhea, sore throat, shortness of breath, sweats, weight loss or wheezing  Nothing aggravates the symptoms  She has tried nothing for the symptoms  The treatment provided no relief  There is no history of asthma, bronchiectasis, bronchitis, COPD, emphysema, environmental allergies or pneumonia  Review of Systems   Review of Systems   Constitutional: Negative  Negative for chills, fever and weight loss  HENT: Negative  Negative for ear pain, postnasal drip, rhinorrhea and sore throat  Eyes: Negative  Respiratory: Positive for cough  Negative for hemoptysis, shortness of breath and wheezing  Cardiovascular: Negative  Negative for chest pain  Gastrointestinal: Negative  Negative for heartburn  Endocrine: Negative  Genitourinary: Negative  Musculoskeletal: Negative  Negative for myalgias  Skin: Negative  Negative for rash  Allergic/Immunologic: Negative  Negative for environmental allergies  Neurological: Negative  Negative for headaches  Hematological: Negative  Psychiatric/Behavioral: Negative  All other systems reviewed and are negative          Current Medications       Current Outpatient Medications:     albuterol (PROAIR HFA) 90 mcg/act inhaler, Inhale 2 puffs every 6 (six) hours as needed for wheezing, Disp: 8 5 g, Rfl: 0    ALPRAZolam ER (XANAX XR) 0 5 MG 24 hr tablet, Take 1 tablet (0 5 mg total) by mouth daily as needed for anxiety, Disp: 30 tablet, Rfl: 0    amLODIPine (NORVASC) 5 mg tablet, TAKE 1 TABLET DAILY, Disp: 90 tablet, Rfl: 3    azithromycin (ZITHROMAX) 250 mg tablet, Take 2 tablets today then 1 tablet daily x 4 days, Disp: 6 tablet, Rfl: 0    benzonatate (TESSALON PERLES) 100 mg capsule, Take 1 capsule (100 mg total) by mouth 3 (three) times a day as needed for cough, Disp: 20 capsule, Rfl: 0    Calcium Carbonate-Vitamin D (CALCIUM 600+D) 600-200 MG-UNIT TABS, Take 1 tablet by mouth 2 (two) times a day, Disp: , Rfl:     Cholecalciferol ([employer] VITAMIN D3) 1000 units capsule, Take by mouth, Disp: , Rfl:     fluticasone (FLONASE) 50 mcg/act nasal spray, into each nostril daily, Disp: , Rfl:     LO LOESTRIN FE 1 MG-10 MCG / 10 MCG TABS, Take 1 tablet by mouth daily, Disp: 84 tablet, Rfl: 4    Omega-3 Fatty Acids (FISH OIL) 1200 MG CAPS, Take by mouth, Disp: , Rfl:     pantoprazole (PROTONIX) 40 mg tablet, Take 1 tablet by mouth 2 (two) times a day, Disp: , Rfl:     sucralfate (CARAFATE) 1 g tablet, Take by mouth, Disp: , Rfl:     Current Allergies     Allergies as of 12/06/2019 - Reviewed 12/06/2019   Allergen Reaction Noted    Gluten meal  02/28/2018            The following portions of the patient's history were reviewed and updated as appropriate: allergies, current medications, past family history, past medical history, past social history, past surgical history and problem list      Past Medical History:   Diagnosis Date    Diabetes mellitus ([facility] [address] ) 02/2011    PRE DM, DIET CONTROLLED       Past Surgical History:   Procedure Laterality Date    KNEE ARTHROSCOPY W/ ACL RECONSTRUCTION AND EPIPHYSEAL HAMSTRING GRAFT  2013       Family History   Problem Relation Age of Onset    Diabetes Sister         Pre DM    Lung cancer Maternal Grandmother     Cancer Maternal Grandfather         Gastric    Stroke Paternal Grandmother         Syndrome    Lung cancer Paternal Grandfather          Medications have been verified  Objective   /80   Pulse 84   Temp 97 7 °F (36 5 °C) (Tympanic)   Resp 18   Ht 5' 1 5" (1 562 m)   Wt 92 5 kg (204 lb)   SpO2 98%   BMI 37 92 kg/m²        Physical Exam     Physical Exam   Constitutional: She is oriented to person, place, and time  She appears well-developed and well-nourished  HENT:   Head: Normocephalic  Right Ear: External ear normal    Left Ear: External ear normal    Nose: Nose normal    Mouth/Throat: Oropharynx is clear and moist    Eyes: Pupils are equal, round, and reactive to light  Conjunctivae and EOM are normal    Neck: Normal range of motion  Neck supple  Cardiovascular: Normal rate, regular rhythm and normal heart sounds  Pulmonary/Chest: Effort normal    Minor coarse joanne nds    Abdominal: Soft  Bowel sounds are normal    Musculoskeletal: Normal range of motion  Neurological: She is alert and oriented to person, place, and time  Skin: Skin is warm  Capillary refill takes less than 2 seconds  Psychiatric: She has a normal mood and affect  Her behavior is normal    Nursing note and vitals reviewed

## 2019-12-06 NOTE — PATIENT INSTRUCTIONS
Acute Bronchitis   WHAT YOU NEED TO KNOW:   Acute bronchitis is swelling and irritation in the air passages of your lungs  This irritation may cause you to cough or have other breathing problems  Acute bronchitis often starts because of another illness, such as a cold or the flu  The illness spreads from your nose and throat to your windpipe and airways  Bronchitis is often called a chest cold  Acute bronchitis lasts about 3 to 6 weeks and is usually not a serious illness  Your cough can last for several weeks  DISCHARGE INSTRUCTIONS:   Return to the emergency department if:   · You cough up blood  · Your lips or fingernails turn blue  · You feel like you are not getting enough air when you breathe  Contact your healthcare provider if:   · You have a fever  · Your breathing problems do not go away or get worse  · Your cough does not get better within 4 weeks  · You have questions or concerns about your condition or care  Self-care:   · Get more rest   Rest helps your body to heal  Slowly start to do more each day  Rest when you feel it is needed  · Avoid irritants in the air  Avoid chemicals, fumes, and dust  Wear a face mask if you must work around dust or fumes  Stay inside on days when air pollution levels are high  If you have allergies, stay inside when pollen counts are high  Do not use aerosol products, such as spray-on deodorant, bug spray, and hair spray  · Do not smoke or be around others who smoke  Nicotine and other chemicals in cigarettes and cigars damages the cilia that move mucus out of your lungs  Ask your healthcare provider for information if you currently smoke and need help to quit  E-cigarettes or smokeless tobacco still contain nicotine  Talk to your healthcare provider before you use these products  · Drink liquids as directed  Liquids help keep your air passages moist and help you cough up mucus   You may need to drink more liquids when you have acute bronchitis  Ask how much liquid to drink each day and which liquids are best for you  · Use a humidifier or vaporizer  Use a cool mist humidifier or a vaporizer to increase air moisture in your home  This may make it easier for you to breathe and help decrease your cough  Decrease risk for acute bronchitis:   · Get the vaccinations you need  Ask your healthcare provider if you should get vaccinated against the flu or pneumonia  · Prevent the spread of germs  You can decrease your risk of acute bronchitis and other illnesses by doing the following:     [facility] your hands often with soap and water  Carry germ-killing hand lotion or gel with you  You can use the lotion or gel to clean your hands when soap and water are not available  ¨ Do not touch your eyes, nose, or mouth unless you have washed your hands first     ¨ Always cover your mouth when you cough to prevent the spread of germs  It is best to cough into a tissue or your shirt sleeve instead of into your hand  Ask those around you cover their mouths when they cough  ¨ Try to avoid people who have a cold or the flu  If you are sick, stay away from others as much as possible  Medicines: Your healthcare provider may  give you any of the following:  · Ibuprofen or acetaminophen  are medicines that help lower your fever  They are available without a doctor's order  Ask your healthcare provider which medicine is right for you  Ask how much to take and how often to take it  Follow directions  These medicines can cause stomach bleeding if not taken correctly  Ibuprofen can cause kidney damage  Do not take ibuprofen if you have kidney disease, an ulcer, or allergies to aspirin  Acetaminophen can cause liver damage  Do not take more than 4,000 milligrams in 24 hours  · Decongestants  help loosen mucus in your lungs and make it easier to cough up  This can help you breathe easier  · Cough suppressants  decrease your urge to cough   If your cough produces mucus, do not take a cough suppressant unless your healthcare provider tells you to  Your healthcare provider may suggest that you take a cough suppressant at night so you can rest     · Inhalers  may be given  Your healthcare provider may give you one or more inhalers to help you breathe easier and cough less  An inhaler gives your medicine to open your airways  Ask your healthcare provider to show you how to use your inhaler correctly  · Take your medicine as directed  Contact your healthcare provider if you think your medicine is not helping or if you have side effects  Tell him of her if you are allergic to any medicine  Keep a list of the medicines, vitamins, and herbs you take  Include the amounts, and when and why you take them  Bring the list or the pill bottles to follow-up visits  Carry your medicine list with you in case of an emergency  Follow up with your healthcare provider as directed:  Write down questions you have so you will remember to ask them during your follow-up visits  © 2017 [employer] Information is for End User's use only and may not be sold, redistributed or otherwise used for commercial purposes  All illustrations and images included in CareNotes® are the copyrighted property of [employer]  or Abhijeet Tarango  The above information is an  only  It is not intended as medical advice for individual conditions or treatments  Talk to your doctor, nurse or pharmacist before following any medical regimen to see if it is safe and effective for you

## 2019-12-09 ENCOUNTER — OFFICE VISIT (OUTPATIENT)
Dept: FAMILY MEDICINE CLINIC | Facility: CLINIC | Age: 49
End: 2019-12-09
Payer: COMMERCIAL

## 2019-12-09 VITALS
HEART RATE: 100 BPM | TEMPERATURE: 98.9 F | RESPIRATION RATE: 16 BRPM | SYSTOLIC BLOOD PRESSURE: 130 MMHG | BODY MASS INDEX: 37.73 KG/M2 | WEIGHT: 205 LBS | DIASTOLIC BLOOD PRESSURE: 90 MMHG | OXYGEN SATURATION: 98 % | HEIGHT: 62 IN

## 2019-12-09 DIAGNOSIS — J20.9 ACUTE BRONCHITIS, UNSPECIFIED ORGANISM: Primary | ICD-10-CM

## 2019-12-09 PROCEDURE — 99213 OFFICE O/P EST LOW 20 MIN: CPT | Performed by: FAMILY MEDICINE

## 2019-12-09 PROCEDURE — 3008F BODY MASS INDEX DOCD: CPT | Performed by: FAMILY MEDICINE

## 2019-12-09 PROCEDURE — 1036F TOBACCO NON-USER: CPT | Performed by: FAMILY MEDICINE

## 2019-12-09 RX ORDER — LEVOFLOXACIN 500 MG/1
500 TABLET, FILM COATED ORAL EVERY 24 HOURS
Qty: 5 TABLET | Refills: 0 | Status: SHIPPED | OUTPATIENT
Start: 2019-12-09 | End: 2019-12-14

## 2019-12-09 RX ORDER — GUAIFENESIN AND CODEINE PHOSPHATE 100; 10 MG/5ML; MG/5ML
5 SOLUTION ORAL 3 TIMES DAILY PRN
Qty: 120 ML | Refills: 0 | Status: SHIPPED | OUTPATIENT
Start: 2019-12-09

## 2019-12-09 NOTE — PROGRESS NOTES
Assessment/Plan:  Patient to complete Z-Mitul nose switch to Levaquin 500 mg daily for 5 days  Patient given prescription for Robitussin with codeine 1 tsp t i d  P r n , caution regarding drowsiness  Recommend increase fluids and rest   Return the office in 1 week or sooner p r n  Paul Calixto Diagnoses and all orders for this visit:    Acute bronchitis, unspecified organism  -     levofloxacin (LEVAQUIN) 500 mg tablet; Take 1 tablet (500 mg total) by mouth every 24 hours for 5 days  -     guaifenesin-codeine (GUAIFENESIN AC) 100-10 MG/5ML liquid; Take 5 mL by mouth 3 (three) times a day as needed for cough          Subjective:      Patient ID: Bassam Ames is a 47 y o  female  Patient is being seen in follow-up from an urgent care visit on 12/06/2019 for acute bronchitis treated with Z-Mitul, Tessalon Perles and albuterol inhaler  She denies significant improvement, continues with cough throughout the day  Patient states she started with cold symptoms 2 weeks ago  Cough is now productive of lighter yellow to whitish mucus  She denies chest tightness or wheezing  She denies fever  URI    This is a new problem  The current episode started 1 to 4 weeks ago  The problem has been unchanged  There has been no fever  Associated symptoms include congestion, coughing and headaches  Pertinent negatives include no ear pain, plugged ear sensation, rhinorrhea, sinus pain, sneezing, sore throat or wheezing  She has tried increased fluids (zpak, tessalon, albuterol inhal) for the symptoms  The treatment provided mild relief  The following portions of the patient's history were reviewed and updated as appropriate: allergies, current medications, past family history, past medical history, past social history, past surgical history and problem list     Review of Systems   HENT: Positive for congestion  Negative for ear pain, rhinorrhea, sinus pain, sneezing and sore throat  Respiratory: Positive for cough   Negative for wheezing  Neurological: Positive for headaches  Objective:      /90 (BP Location: Left arm, Patient Position: Sitting, Cuff Size: Large)   Pulse 100   Temp 98 9 °F (37 2 °C) (Tympanic)   Resp 16   Ht 5' 1 5" (1 562 m)   Wt 93 kg (205 lb)   SpO2 98%   BMI 38 11 kg/m²          Physical Exam   Constitutional: She is oriented to person, place, and time  She appears well-developed and well-nourished  No distress  HENT:   Head: Normocephalic  Right Ear: External ear normal    Left Ear: External ear normal    Turbinates swelling with mucoid drainage  Throat postnasal drainage and injected  Eyes: Conjunctivae are normal  No scleral icterus  Neck: Neck supple  Cardiovascular: Normal rate and regular rhythm  Pulmonary/Chest: Effort normal and breath sounds normal  No respiratory distress  She has no wheezes  Abdominal: Soft  There is no tenderness  Musculoskeletal: She exhibits no edema  Lymphadenopathy:     She has no cervical adenopathy  Neurological: She is alert and oriented to person, place, and time  Skin: Skin is warm and dry  Psychiatric: She has a normal mood and affect  BMI Counseling: Body mass index is 38 11 kg/m²  The BMI is above normal  Nutrition recommendations include reducing portion sizes, decreasing overall calorie intake, 3-5 servings of fruits/vegetables daily, reducing fast food intake, consuming healthier snacks, decreasing soda and/or juice intake, moderation in carbohydrate intake and reducing intake of saturated fat and trans fat  Exercise recommendations include moderate aerobic physical activity for 150 minutes/week

## 2020-05-14 DIAGNOSIS — I10 ESSENTIAL HYPERTENSION: ICD-10-CM

## 2020-05-14 RX ORDER — AMLODIPINE BESYLATE 5 MG/1
TABLET ORAL
Qty: 90 TABLET | Refills: 3 | Status: SHIPPED | OUTPATIENT
Start: 2020-05-14 | End: 2021-05-31

## 2020-06-10 ENCOUNTER — TELEPHONE (OUTPATIENT)
Dept: GYNECOLOGY | Facility: CLINIC | Age: 50
End: 2020-06-10

## 2020-06-17 ENCOUNTER — TELEPHONE (OUTPATIENT)
Dept: OBGYN CLINIC | Facility: CLINIC | Age: 50
End: 2020-06-17

## 2020-06-18 ENCOUNTER — ANNUAL EXAM (OUTPATIENT)
Dept: OBGYN CLINIC | Facility: CLINIC | Age: 50
End: 2020-06-18
Payer: COMMERCIAL

## 2020-06-18 VITALS
SYSTOLIC BLOOD PRESSURE: 132 MMHG | DIASTOLIC BLOOD PRESSURE: 74 MMHG | BODY MASS INDEX: 37.25 KG/M2 | HEIGHT: 62 IN | WEIGHT: 202.4 LBS

## 2020-06-18 DIAGNOSIS — Z12.31 ENCOUNTER FOR SCREENING MAMMOGRAM FOR MALIGNANT NEOPLASM OF BREAST: Primary | ICD-10-CM

## 2020-06-18 DIAGNOSIS — Z30.41 SURVEILLANCE OF CONTRACEPTIVE PILL: ICD-10-CM

## 2020-06-18 PROCEDURE — 99386 PREV VISIT NEW AGE 40-64: CPT | Performed by: OBSTETRICS & GYNECOLOGY

## 2020-06-18 PROCEDURE — 3075F SYST BP GE 130 - 139MM HG: CPT | Performed by: OBSTETRICS & GYNECOLOGY

## 2020-06-18 PROCEDURE — 3008F BODY MASS INDEX DOCD: CPT | Performed by: NURSE PRACTITIONER

## 2020-06-18 PROCEDURE — 3078F DIAST BP <80 MM HG: CPT | Performed by: OBSTETRICS & GYNECOLOGY

## 2020-06-18 RX ORDER — NORETHINDRONE ACETATE AND ETHINYL ESTRADIOL, ETHINYL ESTRADIOL AND FERROUS FUMARATE 1MG-10(24)
1 KIT ORAL DAILY
Qty: 84 TABLET | Refills: 4 | Status: SHIPPED | OUTPATIENT
Start: 2020-06-18

## 2020-07-29 ENCOUNTER — TELEMEDICINE (OUTPATIENT)
Dept: FAMILY MEDICINE CLINIC | Facility: CLINIC | Age: 50
End: 2020-07-29
Payer: COMMERCIAL

## 2020-07-29 DIAGNOSIS — J01.10 ACUTE FRONTAL SINUSITIS, RECURRENCE NOT SPECIFIED: Primary | ICD-10-CM

## 2020-07-29 PROCEDURE — 3075F SYST BP GE 130 - 139MM HG: CPT | Performed by: FAMILY MEDICINE

## 2020-07-29 PROCEDURE — 99213 OFFICE O/P EST LOW 20 MIN: CPT | Performed by: FAMILY MEDICINE

## 2020-07-29 PROCEDURE — 1036F TOBACCO NON-USER: CPT | Performed by: FAMILY MEDICINE

## 2020-07-29 PROCEDURE — 3078F DIAST BP <80 MM HG: CPT | Performed by: FAMILY MEDICINE

## 2020-07-29 RX ORDER — AZITHROMYCIN 250 MG/1
TABLET, FILM COATED ORAL
Qty: 6 TABLET | Refills: 0 | Status: SHIPPED | OUTPATIENT
Start: 2020-07-29 | End: 2020-08-02

## 2020-07-29 NOTE — PROGRESS NOTES
Virtual Regular Visit      Assessment/Plan:  Patient be started on a Z-Mitul and instructed to take Robitussin DM or Mucinex DM p r n  Kayden Po She is encouraged to drink plenty of fluids and rest   Follow-up in 1 week or call sooner p r n     Problem List Items Addressed This Visit     None      Visit Diagnoses     Acute frontal sinusitis, recurrence not specified    -  Primary    Relevant Medications    azithromycin (ZITHROMAX) 250 mg tablet               Reason for visit is No chief complaint on file  Encounter provider Bella Ward DO    Provider located at [address] [place] [address] [address] [phone]      Recent Visits  No visits were found meeting these conditions  Showing recent visits within past 7 days and meeting all other requirements     Future Appointments  No visits were found meeting these conditions  Showing future appointments within next 150 days and meeting all other requirements        The patient was identified by name and date of birth  Bindu García was informed that this is a telemedicine visit and that the visit is being conducted through [employer] and patient was informed that this is not a secure, HIPAA-complaint platform  She agrees to proceed     My office door was closed  No one else was in the room  She acknowledged consent and understanding of privacy and security of the video platform  The patient has agreed to participate and understands they can discontinue the visit at any time  Patient is aware this is a billable service  Subjective  Bindu García is a 47 y o  female complains of sinus problems for greater than 2 weeks  She complains of nasal congestion and stuffiness  She complains of postnasal drainage and cough productive of whitish green phlegm  Patient denies sneezing or shortness of breath  She denies chest tightness or wheezing  Patient denies headache although admits to frontal sinus pressure    Patient denies earache or sore throat  She denies fever, chills, sweats, rash or red eyes  She denies loss of sense of smell or taste and denies nausea, vomiting or diarrhea  She has treated this with NyQuil cold and Sinus with some relief        HPI     Past Medical History:   Diagnosis Date    Diabetes mellitus ([facility] [address] ) 02/2011    PRE DM, DIET CONTROLLED    Varicella        Past Surgical History:   Procedure Laterality Date    KNEE ARTHROSCOPY W/ ACL RECONSTRUCTION AND EPIPHYSEAL HAMSTRING GRAFT  2013       Current Outpatient Medications   Medication Sig Dispense Refill    albuterol (PROAIR HFA) 90 mcg/act inhaler Inhale 2 puffs every 6 (six) hours as needed for wheezing (Patient not taking: Reported on 6/18/2020) 8 5 g 0    ALPRAZolam ER (XANAX XR) 0 5 MG 24 hr tablet Take 1 tablet (0 5 mg total) by mouth daily as needed for anxiety 30 tablet 0    amLODIPine (NORVASC) 5 mg tablet TAKE 1 TABLET DAILY 90 tablet 3    azithromycin (ZITHROMAX) 250 mg tablet Take 2 tablets today then 1 tablet daily x 4 days 6 tablet 0    benzonatate (TESSALON PERLES) 100 mg capsule Take 1 capsule (100 mg total) by mouth 3 (three) times a day as needed for cough (Patient not taking: Reported on 6/18/2020) 20 capsule 0    Calcium Carbonate-Vitamin D (CALCIUM 600+D) 600-200 MG-UNIT TABS Take 1 tablet by mouth 2 (two) times a day      Cholecalciferol (CVS VITAMIN D3) 1000 units capsule Take by mouth      fluticasone (FLONASE) 50 mcg/act nasal spray into each nostril daily      guaifenesin-codeine (GUAIFENESIN AC) 100-10 MG/5ML liquid Take 5 mL by mouth 3 (three) times a day as needed for cough (Patient not taking: Reported on 6/18/2020) 120 mL 0    LO LOESTRIN FE 1 MG-10 MCG / 10 MCG TABS Take 1 tablet by mouth daily 84 tablet 4    Omega-3 Fatty Acids (FISH OIL) 1200 MG CAPS Take by mouth      pantoprazole (PROTONIX) 40 mg tablet Take 1 tablet by mouth 2 (two) times a day      sucralfate (CARAFATE) 1 g tablet Take by mouth       No current facility-administered medications for this visit  Allergies   Allergen Reactions    Gluten Meal        Review of Systems    Video Exam patient appears nasally congestion and respirations are unlabored  She is speaking in complete sentences without shortness of breath or cough  There were no vitals filed for this visit  Physical Exam   Constitutional: She is oriented to person, place, and time  She appears well-developed and well-nourished  No distress  HENT:   Head: Normocephalic  Eyes: Conjunctivae are normal  No scleral icterus  Neck: Neck supple  Pulmonary/Chest: Effort normal  No respiratory distress  Neurological: She is alert and oriented to person, place, and time  Skin: She is not diaphoretic  Psychiatric: She has a normal mood and affect  I spent 15 minutes directly with the patient during this visit      VIRTUAL VISIT DISCLAIMER    Adithya Pablo acknowledges that she has consented to an online visit or consultation  She understands that the online visit is based solely on information provided by her, and that, in the absence of a face-to-face physical evaluation by the physician, the diagnosis she receives is both limited and provisional in terms of accuracy and completeness  This is not intended to replace a full medical face-to-face evaluation by the physician  Adithya Pablo understands and accepts these terms

## 2020-08-04 ENCOUNTER — TELEMEDICINE (OUTPATIENT)
Dept: FAMILY MEDICINE CLINIC | Facility: CLINIC | Age: 50
End: 2020-08-04
Payer: COMMERCIAL

## 2020-08-04 DIAGNOSIS — R09.82 POST-NASAL DRIP: Primary | ICD-10-CM

## 2020-08-04 PROCEDURE — 99213 OFFICE O/P EST LOW 20 MIN: CPT | Performed by: NURSE PRACTITIONER

## 2020-08-04 PROCEDURE — 3078F DIAST BP <80 MM HG: CPT | Performed by: NURSE PRACTITIONER

## 2020-08-04 PROCEDURE — 1036F TOBACCO NON-USER: CPT | Performed by: NURSE PRACTITIONER

## 2020-08-04 PROCEDURE — 3075F SYST BP GE 130 - 139MM HG: CPT | Performed by: NURSE PRACTITIONER

## 2020-08-04 RX ORDER — AZELASTINE 1 MG/ML
2 SPRAY, METERED NASAL 2 TIMES DAILY
Qty: 1 BOTTLE | Refills: 0 | Status: SHIPPED | OUTPATIENT
Start: 2020-08-04 | End: 2020-08-14

## 2020-08-04 NOTE — PROGRESS NOTES
Virtual Regular Visit      Assessment/Plan:    Problem List Items Addressed This Visit     None      Visit Diagnoses     Post-nasal drip    -  Primary    Relevant Medications    azelastine (ASTELIN) 0 1 % nasal spray       Discussed the effects of Astelin  Separate astelin dosing  from her Flonase dosing  Give Astelin 7-10 day trial and if no improvement follow-up  She might be a candidate for Singulair  Reason for visit is   Chief Complaint   Patient presents with    Virtual Regular Visit        Encounter provider RACHNA Pat    Provider located at [address] [place] [address] [phone]      Recent Visits  Date Type Provider Dept   07/29/20 [address], [address] recent visits within past 7 days and meeting all other requirements     Today's Visits  Date Type Provider Dept   [facility] [place]   Showing today's visits and meeting all other requirements     Future Appointments  No visits were found meeting these conditions  Showing future appointments within next 150 days and meeting all other requirements        The patient was identified by name and date of birth  Kiran Kc was informed that this is a telemedicine visit and that the visit is being conducted through [facility] and patient was informed that this is not a secure, HIPAA-complaint platform  She agrees to proceed     My office door was closed  No one else was in the room  She acknowledged consent and understanding of privacy and security of the video platform  The patient has agreed to participate and understands they can discontinue the visit at any time  Patient is aware this is a billable service  Subjective  Kiran Kc is a 47 y o  female Completed Zithromax after her visit here on 07/29/2020  Says her nasal secretions went from green to clear    Is concerned because she keeps coughing; cough is dry  Has a lot of postnasal drip  She did already takes Flonase  She has had no known coronavirus exposures  HPI     Past Medical History:   Diagnosis Date    Diabetes mellitus ([facility] [address] ) 02/2011    PRE DM, DIET CONTROLLED    Varicella        Past Surgical History:   Procedure Laterality Date    KNEE ARTHROSCOPY W/ ACL RECONSTRUCTION AND EPIPHYSEAL HAMSTRING GRAFT  2013       Current Outpatient Medications   Medication Sig Dispense Refill    albuterol (PROAIR HFA) 90 mcg/act inhaler Inhale 2 puffs every 6 (six) hours as needed for wheezing (Patient not taking: Reported on 6/18/2020) 8 5 g 0    ALPRAZolam ER (XANAX XR) 0 5 MG 24 hr tablet Take 1 tablet (0 5 mg total) by mouth daily as needed for anxiety 30 tablet 0    amLODIPine (NORVASC) 5 mg tablet TAKE 1 TABLET DAILY 90 tablet 3    azelastine (ASTELIN) 0 1 % nasal spray 2 sprays into each nostril 2 (two) times a day for 10 days Use in each nostril as directed 1 Bottle 0    Calcium Carbonate-Vitamin D (CALCIUM 600+D) 600-200 MG-UNIT TABS Take 1 tablet by mouth 2 (two) times a day      Cholecalciferol (CVS VITAMIN D3) 1000 units capsule Take by mouth      fluticasone (FLONASE) 50 mcg/act nasal spray into each nostril daily      guaifenesin-codeine (GUAIFENESIN AC) 100-10 MG/5ML liquid Take 5 mL by mouth 3 (three) times a day as needed for cough (Patient not taking: Reported on 6/18/2020) 120 mL 0    LO LOESTRIN FE 1 MG-10 MCG / 10 MCG TABS Take 1 tablet by mouth daily 84 tablet 4    Omega-3 Fatty Acids (FISH OIL) 1200 MG CAPS Take by mouth      pantoprazole (PROTONIX) 40 mg tablet Take 1 tablet by mouth 2 (two) times a day      sucralfate (CARAFATE) 1 g tablet Take by mouth       No current facility-administered medications for this visit  Allergies   Allergen Reactions    Gluten Meal        Review of Systems   Constitutional: Negative for chills, diaphoresis, fatigue and fever     HENT: Positive for congestion, postnasal drip, rhinorrhea (clear asecretions), sinus pressure, sinus pain (mild) and sneezing  Respiratory: Positive for cough  Negative for chest tightness, shortness of breath and wheezing  Allergic/Immunologic: Positive for environmental allergies  Video Exam    There were no vitals filed for this visit  Physical Exam   Constitutional: She is oriented to person, place, and time  HENT:   Head: Normocephalic  Eyes: Conjunctivae are normal    Pulmonary/Chest: Effort normal    Abdominal: Normal appearance  Neurological: She is alert and oriented to person, place, and time  VIRTUAL VISIT DISCLAIMER    Lizette Sanders acknowledges that she has consented to an online visit or consultation  She understands that the online visit is based solely on information provided by her, and that, in the absence of a face-to-face physical evaluation by the physician, the diagnosis she receives is both limited and provisional in terms of accuracy and completeness  This is not intended to replace a full medical face-to-face evaluation by the physician  Lizette Sanders understands and accepts these terms

## 2020-10-09 ENCOUNTER — HOSPITAL ENCOUNTER (OUTPATIENT)
Dept: MAMMOGRAPHY | Facility: MEDICAL CENTER | Age: 50
Discharge: HOME | End: 2020-10-09
Payer: COMMERCIAL

## 2020-10-09 VITALS — BODY MASS INDEX: 37.17 KG/M2 | WEIGHT: 202 LBS | HEIGHT: 62 IN

## 2020-10-09 DIAGNOSIS — Z12.31 ENCOUNTER FOR SCREENING MAMMOGRAM FOR MALIGNANT NEOPLASM OF BREAST: ICD-10-CM

## 2020-10-09 PROCEDURE — 77063 BREAST TOMOSYNTHESIS BI: CPT

## 2020-10-09 PROCEDURE — 77067 SCR MAMMO BI INCL CAD: CPT

## 2020-10-20 ENCOUNTER — HOSPITAL ENCOUNTER (OUTPATIENT)
Dept: ULTRASOUND IMAGING | Facility: CLINIC | Age: 50
Discharge: HOME | End: 2020-10-20
Payer: COMMERCIAL

## 2020-10-20 DIAGNOSIS — R92.8 ABNORMAL MAMMOGRAM: ICD-10-CM

## 2020-10-20 DIAGNOSIS — R92.8 ABNORMAL MAMMOGRAM OF LEFT BREAST: Primary | ICD-10-CM

## 2020-10-20 PROCEDURE — 76642 ULTRASOUND BREAST LIMITED: CPT

## 2020-11-30 ENCOUNTER — TELEMEDICINE (OUTPATIENT)
Dept: FAMILY MEDICINE CLINIC | Facility: CLINIC | Age: 50
End: 2020-11-30
Payer: COMMERCIAL

## 2020-11-30 DIAGNOSIS — R05.9 COUGH: Primary | ICD-10-CM

## 2020-11-30 DIAGNOSIS — R05.9 COUGH: ICD-10-CM

## 2020-11-30 PROCEDURE — U0003 INFECTIOUS AGENT DETECTION BY NUCLEIC ACID (DNA OR RNA); SEVERE ACUTE RESPIRATORY SYNDROME CORONAVIRUS 2 (SARS-COV-2) (CORONAVIRUS DISEASE [COVID-19]), AMPLIFIED PROBE TECHNIQUE, MAKING USE OF HIGH THROUGHPUT TECHNOLOGIES AS DESCRIBED BY CMS-2020-01-R: HCPCS | Performed by: FAMILY MEDICINE

## 2020-11-30 PROCEDURE — 99213 OFFICE O/P EST LOW 20 MIN: CPT | Performed by: FAMILY MEDICINE

## 2020-12-01 LAB — SARS-COV-2 RNA SPEC QL NAA+PROBE: NOT DETECTED

## 2021-01-20 ENCOUNTER — TELEPHONE (OUTPATIENT)
Dept: OBGYN CLINIC | Facility: CLINIC | Age: 51
End: 2021-01-20

## 2021-01-20 NOTE — TELEPHONE ENCOUNTER
Left message on nurse line states she needs 6 months follow up for left breast script  Advised orders are already in system she just needs to call and schedule, verbalized understanding

## 2021-03-10 DIAGNOSIS — Z23 ENCOUNTER FOR IMMUNIZATION: ICD-10-CM

## 2021-03-23 ENCOUNTER — TELEPHONE (OUTPATIENT)
Dept: OBGYN CLINIC | Facility: CLINIC | Age: 51
End: 2021-03-23

## 2021-03-23 NOTE — TELEPHONE ENCOUNTER
Left message on nurse line states she is scheduled on 4/19 for follow up Left Dx mammogram and Dx left breast ultrasound  She is scheduled for second covid vaccine on 4/3  Should she keep appt for mammogram or r/s  Routing to provider for further recommendations

## 2021-03-24 NOTE — TELEPHONE ENCOUNTER
Advised she being seen for a periviously abnormal mammogram, so provider recommends to keep appt  Verbalized understanding

## 2021-04-19 ENCOUNTER — HOSPITAL ENCOUNTER (OUTPATIENT)
Dept: ULTRASOUND IMAGING | Facility: CLINIC | Age: 51
Discharge: HOME | End: 2021-04-19
Payer: COMMERCIAL

## 2021-04-19 ENCOUNTER — HOSPITAL ENCOUNTER (OUTPATIENT)
Dept: MAMMOGRAPHY | Facility: CLINIC | Age: 51
Discharge: HOME | End: 2021-04-19
Payer: COMMERCIAL

## 2021-04-19 DIAGNOSIS — R92.8 ABNORMAL MAMMOGRAM OF LEFT BREAST: ICD-10-CM

## 2021-04-19 DIAGNOSIS — R92.8 ABNORMAL ULTRASOUND OF BREAST: Primary | ICD-10-CM

## 2021-04-19 PROCEDURE — 77065 DX MAMMO INCL CAD UNI: CPT

## 2021-04-19 PROCEDURE — 76642 ULTRASOUND BREAST LIMITED: CPT

## 2021-04-19 PROCEDURE — G0279 TOMOSYNTHESIS, MAMMO: HCPCS

## 2021-05-31 DIAGNOSIS — I10 ESSENTIAL HYPERTENSION: ICD-10-CM

## 2021-05-31 RX ORDER — AMLODIPINE BESYLATE 5 MG/1
TABLET ORAL
Qty: 90 TABLET | Refills: 0 | Status: SHIPPED | OUTPATIENT
Start: 2021-05-31

## 2021-07-19 ENCOUNTER — HOSPITAL ENCOUNTER (OUTPATIENT)
Dept: ULTRASOUND IMAGING | Facility: CLINIC | Age: 51
Discharge: HOME | End: 2021-07-19
Payer: COMMERCIAL

## 2021-07-19 VITALS — BODY MASS INDEX: 37.17 KG/M2 | HEIGHT: 62 IN | WEIGHT: 202 LBS

## 2021-07-19 DIAGNOSIS — R92.8 ABNORMAL ULTRASOUND OF BREAST: ICD-10-CM

## 2021-07-19 PROCEDURE — 76642 ULTRASOUND BREAST LIMITED: CPT

## 2023-08-08 ENCOUNTER — OFFICE VISIT (OUTPATIENT)
Dept: OBGYN CLINIC | Facility: CLINIC | Age: 53
End: 2023-08-08
Payer: COMMERCIAL

## 2023-08-08 VITALS
SYSTOLIC BLOOD PRESSURE: 155 MMHG | HEIGHT: 62 IN | DIASTOLIC BLOOD PRESSURE: 97 MMHG | OXYGEN SATURATION: 97 % | WEIGHT: 200 LBS | BODY MASS INDEX: 36.8 KG/M2 | HEART RATE: 84 BPM

## 2023-08-08 DIAGNOSIS — S92.354A CLOSED NONDISPLACED FRACTURE OF FIFTH METATARSAL BONE OF RIGHT FOOT, INITIAL ENCOUNTER: Primary | ICD-10-CM

## 2023-08-08 PROCEDURE — 99204 OFFICE O/P NEW MOD 45 MIN: CPT | Performed by: FAMILY MEDICINE

## 2023-08-08 RX ORDER — AMLODIPINE BESYLATE 10 MG/1
TABLET ORAL
COMMUNITY
Start: 2023-05-26

## 2023-08-08 RX ORDER — PANTOPRAZOLE SODIUM 20 MG/1
TABLET, DELAYED RELEASE ORAL
COMMUNITY
Start: 2022-03-23

## 2023-08-08 RX ORDER — IBUPROFEN 800 MG/1
TABLET ORAL
COMMUNITY
Start: 2023-07-11

## 2023-08-08 RX ORDER — ASPIRIN 81 MG
81 TABLET,CHEWABLE ORAL 2 TIMES DAILY
COMMUNITY
Start: 2023-07-12

## 2023-08-08 RX ORDER — HYDROCHLOROTHIAZIDE 12.5 MG/1
TABLET ORAL
COMMUNITY
Start: 2023-06-10

## 2023-08-08 NOTE — PROGRESS NOTES
[facility] [place]  1983 [address] [id number] [phone]      Assessment:  1. Closed nondisplaced fracture of fifth metatarsal bone of right foot, initial encounter        Plan:  Patient Instructions   F/u 2 wks  XR- R foot 2 wks  Weight bearing as tolerated. Continue wearing boot  Range of motion exercises 4x daily  Stop ASA     Return in about 2 weeks (around 8/22/2023) for Recheck. Chief Complaint:  Chief Complaint   Patient presents with   • Right Foot - Pain, Fracture       Subjective:   HPI    Patient ID: Sis November is a 47 y.o. female     Here for R 5th metatarsal fx  4 wks ago in [place] fell off an [address] [place] [address] Street and twisted her R foot  Seen at [facility]. XR done. Place in boot and using scooter  less pain for a few wks  achey pain   Taking ASA      Review of Systems   Constitutional: Negative for fatigue and fever. Respiratory: Negative for shortness of breath. Cardiovascular: Negative for chest pain. Gastrointestinal: Negative for abdominal pain and nausea. Genitourinary: Negative for dysuria. Musculoskeletal: Positive for arthralgias. Skin: Negative for rash and wound. Neurological: Negative for weakness and headaches. Objective:  Vitals:  /97   Pulse 84   Ht 5' 1.5" (1.562 m)   Wt 90.7 kg (200 lb)   SpO2 97%   BMI 37.18 kg/m²     The following portions of the patient's history were reviewed and updated as appropriate: allergies, current medications, past family history, past medical history, past social history, past surgical history, and problem list.    Physical exam:  Physical Exam  Constitutional:       Appearance: Normal appearance. She is normal weight. HENT:      Head: Normocephalic. Eyes:      Extraocular Movements: Extraocular movements intact. Pulmonary:      Effort: Pulmonary effort is normal.   Musculoskeletal:         General: Tenderness present. Cervical back: Normal range of motion. Comments: R foot-  5th metatarsal pain with inversion  No TTP  NVI   Skin:     General: Skin is warm and dry. Neurological:      General: No focal deficit present. Mental Status: She is alert and oriented to person, place, and time. Mental status is at baseline. Psychiatric:         Mood and Affect: Mood normal.         Behavior: Behavior normal.         Thought Content: Thought content normal.         Judgment: Judgment normal.       Ortho Exam    I have personally reviewed pertinent films in PACS and my interpretation is XR  R foot-  5th metatarsal oblique fx.       Shaan Otto MD

## 2023-08-08 NOTE — PATIENT INSTRUCTIONS
F/u 2 wks  XR- R foot 2 wks  Weight bearing as tolerated.   Continue wearing boot  Range of motion exercises 4x daily  Stop ASA

## 2023-08-08 NOTE — LETTER
August 8, 2023     Patient: Carlos Myers  YOB: 1970  Date of Visit: 8/8/2023      To Whom it May Concern:    Tee Flaherty is under my professional care. Prince Chester was seen in my office on 8/8/2023. If you have any questions or concerns, please don't hesitate to call.          Sincerely,          Meredith Murphy MD        CC: No Recipients

## 2023-08-15 ENCOUNTER — TELEPHONE (OUTPATIENT)
Age: 53
End: 2023-08-15

## 2023-08-22 ENCOUNTER — OFFICE VISIT (OUTPATIENT)
Dept: OBGYN CLINIC | Facility: CLINIC | Age: 53
End: 2023-08-22
Payer: COMMERCIAL

## 2023-08-22 VITALS
HEIGHT: 62 IN | WEIGHT: 200 LBS | OXYGEN SATURATION: 96 % | BODY MASS INDEX: 36.8 KG/M2 | SYSTOLIC BLOOD PRESSURE: 110 MMHG | DIASTOLIC BLOOD PRESSURE: 78 MMHG | HEART RATE: 96 BPM

## 2023-08-22 DIAGNOSIS — S92.354D CLOSED NONDISPLACED FRACTURE OF FIFTH METATARSAL BONE OF RIGHT FOOT WITH ROUTINE HEALING, SUBSEQUENT ENCOUNTER: Primary | ICD-10-CM

## 2023-08-22 PROCEDURE — 99214 OFFICE O/P EST MOD 30 MIN: CPT | Performed by: FAMILY MEDICINE

## 2023-08-22 NOTE — PROGRESS NOTES
[facility] [place]  [address] [address] [phone]      Assessment:  1. Closed nondisplaced fracture of fifth metatarsal bone of right foot with routine healing, subsequent encounter  -     DXA bone density spine hip and pelvis; Future; Expected date: 09/22/2023  -     XR foot 3+ vw right; Future; Expected date: 08/22/2023        Plan:  Patient Instructions   F/u 2 wks  Continue wearing boot  Take off boot 4x per day for range of motion exercises. Return in about 2 weeks (around 9/5/2023) for Recheck. Chief Complaint:  Chief Complaint   Patient presents with   • Right Foot - Follow-up       Subjective:   HPI    Patient ID: Mago Arredondo is a 47 y.o. female     Here for R 5th metatarsal fx  Having less pain  Mild pain walking in boot intermittently  Taking ASA    Review of Systems   Constitutional: Negative for fatigue and fever. Respiratory: Negative for shortness of breath. Cardiovascular: Negative for chest pain. Gastrointestinal: Negative for abdominal pain and nausea. Genitourinary: Negative for dysuria. Musculoskeletal: Positive for arthralgias. Skin: Negative for rash and wound. Neurological: Negative for weakness and headaches. Objective:  Vitals:  /78   Pulse 96   Ht 5' 1.5" (1.562 m)   Wt 90.7 kg (200 lb)   SpO2 96%   BMI 37.18 kg/m²     The following portions of the patient's history were reviewed and updated as appropriate: allergies, current medications, past family history, past medical history, past social history, past surgical history, and problem list.    Physical exam:  Physical Exam  Constitutional:       Appearance: Normal appearance. She is normal weight. HENT:      Head: Normocephalic. Eyes:      Extraocular Movements: Extraocular movements intact. Pulmonary:      Effort: Pulmonary effort is normal.   Musculoskeletal:         General: Tenderness present.       Cervical back: Normal range of motion. Comments: R foot 5th metatarsal TTP   Skin:     General: Skin is warm and dry. Neurological:      General: No focal deficit present. Mental Status: She is alert and oriented to person, place, and time. Mental status is at baseline. Psychiatric:         Mood and Affect: Mood normal.         Behavior: Behavior normal.         Thought Content: Thought content normal.         Judgment: Judgment normal.       Ortho Exam    I have personally reviewed pertinent films in PACS and my interpretation is XR_ R foot- healing 5th metacarpal shaft fx.       Tim Lock MD

## 2023-09-26 ENCOUNTER — OFFICE VISIT (OUTPATIENT)
Dept: OBGYN CLINIC | Facility: CLINIC | Age: 53
End: 2023-09-26
Payer: COMMERCIAL

## 2023-09-26 VITALS
HEART RATE: 97 BPM | OXYGEN SATURATION: 97 % | BODY MASS INDEX: 36.8 KG/M2 | DIASTOLIC BLOOD PRESSURE: 76 MMHG | WEIGHT: 200 LBS | HEIGHT: 62 IN | SYSTOLIC BLOOD PRESSURE: 139 MMHG

## 2023-09-26 DIAGNOSIS — S92.354D CLOSED NONDISPLACED FRACTURE OF FIFTH METATARSAL BONE OF RIGHT FOOT WITH ROUTINE HEALING, SUBSEQUENT ENCOUNTER: Primary | ICD-10-CM

## 2023-09-26 PROCEDURE — 99213 OFFICE O/P EST LOW 20 MIN: CPT | Performed by: FAMILY MEDICINE

## 2023-09-26 NOTE — PROGRESS NOTES
[facility] [place]  [address] [facility] [id number] [phone]      Assessment:  1. Closed nondisplaced fracture of fifth metatarsal bone of right foot with routine healing, subsequent encounter        Plan:  Patient Instructions   F/u 6 wks  Hard soled shoe. May begin walking for exercise in 2 wks if having no pain. Start with 1/2 mile every other day and increase 1/2 mile daily every week. Return in about 6 weeks (around 11/7/2023) for Recheck. Chief Complaint:  Chief Complaint   Patient presents with   • Right Foot - Follow-up, Fracture       Subjective:   HPI    Patient ID: Baron Baron is a 47 y.o. female     Here for R 5th metatarsal fx  Having less pain- very little  No pain at injury spot. No pain at fx  Tylenol PRN    Review of Systems   Constitutional: Negative for fatigue and fever. Respiratory: Negative for shortness of breath. Cardiovascular: Negative for chest pain. Gastrointestinal: Negative for abdominal pain and nausea. Genitourinary: Negative for dysuria. Musculoskeletal: Positive for arthralgias. Skin: Negative for rash and wound. Neurological: Negative for weakness and headaches. Objective:  Vitals:  /76   Pulse 97   Ht 5' 1.5" (1.562 m)   Wt 90.7 kg (200 lb)   SpO2 97%   BMI 37.18 kg/m²     The following portions of the patient's history were reviewed and updated as appropriate: allergies, current medications, past family history, past medical history, past social history, past surgical history, and problem list.    Physical exam:  Physical Exam  Constitutional:       Appearance: Normal appearance. She is normal weight. HENT:      Head: Normocephalic. Eyes:      Extraocular Movements: Extraocular movements intact. Pulmonary:      Effort: Pulmonary effort is normal.   Musculoskeletal:         General: No tenderness. Cervical back: Normal range of motion.       Comments: R foot- no TTP   Skin:     General: Skin is warm and dry. Neurological:      General: No focal deficit present. Mental Status: She is alert and oriented to person, place, and time. Mental status is at baseline. Psychiatric:         Mood and Affect: Mood normal.         Behavior: Behavior normal.         Thought Content:  Thought content normal.         Judgment: Judgment normal.       Ortho Exam          Sam New MD

## 2023-09-26 NOTE — PATIENT INSTRUCTIONS
F/u 6 wks  Hard soled shoe. May begin walking for exercise in 2 wks if having no pain. Start with 1/2 mile every other day and increase 1/2 mile daily every week.

## 2023-10-23 ENCOUNTER — HOSPITAL ENCOUNTER (OUTPATIENT)
Dept: BONE DENSITY | Facility: MEDICAL CENTER | Age: 53
Discharge: HOME | End: 2023-10-23
Payer: COMMERCIAL

## 2023-10-23 DIAGNOSIS — S92.354D CLOSED NONDISPLACED FRACTURE OF FIFTH METATARSAL BONE OF RIGHT FOOT WITH ROUTINE HEALING, SUBSEQUENT ENCOUNTER: ICD-10-CM

## 2023-10-23 PROCEDURE — 77080 DXA BONE DENSITY AXIAL: CPT

## 2023-11-14 ENCOUNTER — OFFICE VISIT (OUTPATIENT)
Dept: OBGYN CLINIC | Facility: CLINIC | Age: 53
End: 2023-11-14
Payer: COMMERCIAL

## 2023-11-14 VITALS
HEART RATE: 90 BPM | BODY MASS INDEX: 36.62 KG/M2 | HEIGHT: 62 IN | OXYGEN SATURATION: 95 % | WEIGHT: 199 LBS | SYSTOLIC BLOOD PRESSURE: 156 MMHG | DIASTOLIC BLOOD PRESSURE: 74 MMHG

## 2023-11-14 DIAGNOSIS — S92.354D CLOSED NONDISPLACED FRACTURE OF FIFTH METATARSAL BONE OF RIGHT FOOT WITH ROUTINE HEALING, SUBSEQUENT ENCOUNTER: Primary | ICD-10-CM

## 2023-11-14 PROCEDURE — 99213 OFFICE O/P EST LOW 20 MIN: CPT | Performed by: FAMILY MEDICINE

## 2023-11-14 NOTE — PROGRESS NOTES
[facility] [place]  [id number] [facility] [id number] [phone]      Assessment:  1. Closed nondisplaced fracture of fifth metatarsal bone of right foot with routine healing, subsequent encounter        Plan:  Patient Instructions   F/u as needed  Activity as tolerated. Return if symptoms worsen or fail to improve. Chief Complaint:  Chief Complaint   Patient presents with    Right Foot - Follow-up, Fracture       Subjective:   HPI    Patient ID: Jovi Alcantar is a 46 y.o. female     Here for R 5th metatarsal fx  Having no pain  No pain walking  No pain meds needed. Review of Systems   Constitutional:  Negative for fatigue and fever. Respiratory:  Negative for shortness of breath. Cardiovascular:  Negative for chest pain. Gastrointestinal:  Negative for abdominal pain and nausea. Genitourinary:  Negative for dysuria. Musculoskeletal:  Negative for arthralgias. Skin:  Negative for rash and wound. Neurological:  Negative for weakness and headaches. Objective:  Vitals:  /74   Pulse 90   Ht 5' 1.5" (1.562 m)   Wt 90.3 kg (199 lb)   SpO2 95%   BMI 36.99 kg/m²     The following portions of the patient's history were reviewed and updated as appropriate: allergies, current medications, past family history, past medical history, past social history, past surgical history, and problem list.    Physical exam:  Physical Exam  Constitutional:       Appearance: Normal appearance. She is normal weight. HENT:      Head: Normocephalic. Eyes:      Extraocular Movements: Extraocular movements intact. Pulmonary:      Effort: Pulmonary effort is normal.   Musculoskeletal:         General: No tenderness. Cervical back: Normal range of motion. Comments: R foot no TTP   Skin:     General: Skin is warm and dry. Neurological:      General: No focal deficit present.       Mental Status: She is alert and oriented to person, place, and time. Mental status is at baseline. Psychiatric:         Mood and Affect: Mood normal.         Behavior: Behavior normal.         Thought Content:  Thought content normal.         Judgment: Judgment normal.       Ortho Exam          Meo Ledbetter MD

## 2023-11-15 ENCOUNTER — OFFICE VISIT (OUTPATIENT)
Dept: OBGYN CLINIC | Facility: CLINIC | Age: 53
End: 2023-11-15
Payer: COMMERCIAL

## 2023-11-15 VITALS
SYSTOLIC BLOOD PRESSURE: 110 MMHG | HEIGHT: 62 IN | BODY MASS INDEX: 38.02 KG/M2 | DIASTOLIC BLOOD PRESSURE: 70 MMHG | WEIGHT: 206.6 LBS

## 2023-11-15 DIAGNOSIS — Z12.31 ENCOUNTER FOR SCREENING MAMMOGRAM FOR MALIGNANT NEOPLASM OF BREAST: ICD-10-CM

## 2023-11-15 DIAGNOSIS — Z12.4 ENCOUNTER FOR SCREENING FOR MALIGNANT NEOPLASM OF CERVIX: ICD-10-CM

## 2023-11-15 DIAGNOSIS — Z01.419 WELL WOMAN EXAM WITH ROUTINE GYNECOLOGICAL EXAM: Primary | ICD-10-CM

## 2023-11-15 DIAGNOSIS — Z11.51 SCREENING FOR HPV (HUMAN PAPILLOMAVIRUS): ICD-10-CM

## 2023-11-15 PROCEDURE — S0610 ANNUAL GYNECOLOGICAL EXAMINA: HCPCS | Performed by: PHYSICIAN ASSISTANT

## 2023-11-15 PROCEDURE — G0145 SCR C/V CYTO,THINLAYER,RESCR: HCPCS | Performed by: PHYSICIAN ASSISTANT

## 2023-11-15 PROCEDURE — G0476 HPV COMBO ASSAY CA SCREEN: HCPCS | Performed by: PHYSICIAN ASSISTANT

## 2023-11-15 NOTE — PROGRESS NOTES
ASSESSMENT & PLAN:   Diagnoses and all orders for this visit:    Well woman exam with routine gynecological exam  -     Liquid-based pap, screening    Encounter for screening for malignant neoplasm of cervix  -     Liquid-based pap, screening    Screening for HPV (human papillomavirus)  -     Liquid-based pap, screening    Encounter for screening mammogram for malignant neoplasm of breast  -     Mammo screening bilateral w 3d & cad; Future          The following were reviewed in today's visit: ASCCP guidelines, Gardisil vaccination, STD testing breast self exam, mammography screening ordered, STD testing, osteoporosis, adequate intake of calcium and vitamin D, exercise, healthy diet, and colonoscopy discussed and ordered. Patient to return to office in yearly for annual exam.     All questions have been answered to her satisfaction. CC:  Annual Gynecologic Examination  Chief Complaint   Patient presents with    Gynecologic Exam     Annual exam, pap indicated. Pt is well, states there are no gyn concerns. pap 2018 wnl, neg hpv  mammo 419/21  Dxa 10/23/23  Colonoscopy 19       HPI: Genevieve Levy is a 46 y.o. Sergio Buster who presents for annual gynecologic examination. She has the following concerns:  none at this time      Health Maintenance:    Exercise: intermittently due to broken foot  Breast exams/breast awareness: yes  Last mammogram:   Colorectal cancer screenin  DXA:     Past Medical History:   Diagnosis Date    Diabetes mellitus ([address]) 2011    PRE DM, DIET CONTROLLED    Fractures 2023    Broke right foot    Hypertension     Unknown when started with high blood pressure    Stomach disorder     Acid Reflux    Varicella        Past Surgical History:   Procedure Laterality Date    KNEE ARTHROSCOPY W/ ACL RECONSTRUCTION AND EPIPHYSEAL HAMSTRING GRAFT      KNEE SURGERY  2003    ACL reconstruction       Past OB/Gyn History:   No LMP recorded.  Patient is postmenopausal.    Menopausal status: postmenopausal  Menopausal symptoms: None    Last Pap: 2018 : no abnormalities  History of abnormal Pap smear: no    Patient is currently sexually active.    STD testing: no  Current contraception: post menopausal status      Family History  Family History   Problem Relation Age of Onset    Breast cancer Mother 71    Cancer Mother         Breast, brain, liver and lung cancer    Cancer Father         Skin cancer    Diabetes Father     Stroke Father     Diabetes Sister         Pre DM    Cancer Sister         Stage 3 Metastatic Melanoma    No Known Problems Sister     No Known Problems Sister     Lung cancer Maternal Grandmother 59    Cancer Maternal Grandmother         Lung cancer    Cancer Maternal Grandfather         Stomach cancer    Stroke Paternal Grandmother         Syndrome    Lung cancer Paternal Grandfather 72    Cancer Paternal Grandfather         Liver cancer    Cancer Maternal Uncle         Prostate cancer       Family history of uterine or ovarian cancer: no  Family history of breast cancer: yes  Family history of colon cancer: no    Social History:  Social History     Socioeconomic History    Marital status: Single     Spouse name: Not on file    Number of children: Not on file    Years of education: Not on file    Highest education level: Not on file   Occupational History    Not on file   Tobacco Use    Smoking status: Never    Smokeless tobacco: Never   Vaping Use    Vaping Use: Never used   Substance and Sexual Activity    Alcohol use: Yes     Comment: Maybe drink once a month    Drug use: No    Sexual activity: Yes     Partners: Male     Birth control/protection: None, Post-menopausal     Comment: I haven't had a period since 4/18/2022   Other Topics Concern    Not on file   Social History Narrative    Daily coffee consumption (2 cups/day)     Social Determinants of Health     Financial Resource Strain: Not on file   Food Insecurity: Not on file Transportation Needs: Not on file   Physical Activity: Not on file   Stress: Not on file   Social Connections: Not on file   Intimate Partner Violence: Not on file   Housing Stability: Not on file     Domestic violence screen: negative    Allergies: Allergies   Allergen Reactions    Dust Mite Extract Other (See Comments)    Mixed Ragweed Eye Swelling and Nasal Congestion       Medications:    Current Outpatient Medications:     amLODIPine (NORVASC) 10 mg tablet, TAKE 1 TABLET BY MOUTH 1 TIME EACH DAY., Disp: , Rfl:     FAMOTIDINE-CA CARB-MAG HYDROX PO, , Disp: , Rfl:     fluticasone (FLONASE) 50 mcg/act nasal spray, into each nostril daily, Disp: , Rfl:     hydrochlorothiazide (HYDRODIURIL) 12.5 mg tablet, TAKE 1 TABLET BY MOUTH 1 TIME EACH DAY., Disp: , Rfl:     pantoprazole (PROTONIX) 20 mg tablet, , Disp: , Rfl:     azelastine (ASTELIN) 0.1 % nasal spray, 2 sprays into each nostril 2 (two) times a day for 10 days Use in each nostril as directed, Disp: 1 Bottle, Rfl: 0    Review of Systems:  Review of Systems   Constitutional:  Negative for chills, fever and unexpected weight change. Respiratory:  Negative for shortness of breath. Cardiovascular:  Negative for chest pain. Gastrointestinal:  Negative for abdominal pain, diarrhea, nausea and vomiting. Skin:  Negative for rash. Psychiatric/Behavioral:  Negative for dysphoric mood. The patient is not nervous/anxious. Physical Exam:  /70 (BP Location: Left arm, Patient Position: Sitting, Cuff Size: Standard)   Ht 5' 1.5" (1.562 m)   Wt 93.7 kg (206 lb 9.6 oz)   BMI 38.40 kg/m²    Physical Exam  Constitutional:       Appearance: Normal appearance. Genitourinary:      Vulva and urethral meatus normal.      No lesions in the vagina. Right Labia: No rash or lesions. Left Labia: No lesions or rash. No vaginal discharge, erythema or bleeding. Right Adnexa: not tender and no mass present.      Left Adnexa: not tender and no mass present. No cervical discharge or lesion. Uterus is not tender. Breasts:     Breasts are symmetrical.      Right: No mass or skin change. Left: No mass or skin change. HENT:      Head: Normocephalic and atraumatic. Cardiovascular:      Rate and Rhythm: Normal rate and regular rhythm. Heart sounds: Normal heart sounds. No murmur heard. No friction rub. No gallop. Pulmonary:      Effort: Pulmonary effort is normal.      Breath sounds: Normal breath sounds. No wheezing, rhonchi or rales. Abdominal:      General: Abdomen is flat. There is no distension. Palpations: Abdomen is soft. Tenderness: There is no abdominal tenderness. Musculoskeletal:      Cervical back: Neck supple. Lymphadenopathy:      Upper Body:      Right upper body: No axillary adenopathy. Left upper body: No axillary adenopathy. Neurological:      General: No focal deficit present. Mental Status: She is alert. Skin:     General: Skin is warm and dry. Psychiatric:         Mood and Affect: Mood normal.         Behavior: Behavior normal.   Vitals reviewed.

## 2023-11-16 LAB
HPV HR 12 DNA CVX QL NAA+PROBE: NEGATIVE
HPV16 DNA CVX QL NAA+PROBE: NEGATIVE
HPV18 DNA CVX QL NAA+PROBE: NEGATIVE

## 2023-11-24 LAB
LAB AP GYN PRIMARY INTERPRETATION: NORMAL
Lab: NORMAL

## 2023-12-07 ENCOUNTER — OFFICE VISIT (OUTPATIENT)
Dept: FAMILY MEDICINE CLINIC | Facility: CLINIC | Age: 53
End: 2023-12-07
Payer: COMMERCIAL

## 2023-12-07 VITALS
WEIGHT: 199.4 LBS | HEART RATE: 67 BPM | BODY MASS INDEX: 36.7 KG/M2 | OXYGEN SATURATION: 100 % | HEIGHT: 62 IN | DIASTOLIC BLOOD PRESSURE: 74 MMHG | SYSTOLIC BLOOD PRESSURE: 144 MMHG | TEMPERATURE: 96.2 F

## 2023-12-07 DIAGNOSIS — R09.82 POST-NASAL DRIP: ICD-10-CM

## 2023-12-07 DIAGNOSIS — I10 PRIMARY HYPERTENSION: ICD-10-CM

## 2023-12-07 DIAGNOSIS — K21.9 GASTROESOPHAGEAL REFLUX DISEASE, UNSPECIFIED WHETHER ESOPHAGITIS PRESENT: Primary | ICD-10-CM

## 2023-12-07 DIAGNOSIS — J30.2 SEASONAL ALLERGIC RHINITIS, UNSPECIFIED TRIGGER: ICD-10-CM

## 2023-12-07 PROBLEM — J02.9 PHARYNGITIS: Status: RESOLVED | Noted: 2019-07-08 | Resolved: 2023-12-07

## 2023-12-07 PROCEDURE — 99214 OFFICE O/P EST MOD 30 MIN: CPT | Performed by: FAMILY MEDICINE

## 2023-12-07 RX ORDER — AMLODIPINE BESYLATE 10 MG/1
10 TABLET ORAL DAILY
Qty: 90 TABLET | Refills: 3 | Status: SHIPPED | OUTPATIENT
Start: 2023-12-07

## 2023-12-07 RX ORDER — HYDROCHLOROTHIAZIDE 12.5 MG/1
12.5 TABLET ORAL DAILY
Qty: 90 TABLET | Refills: 3 | Status: SHIPPED | OUTPATIENT
Start: 2023-12-07

## 2023-12-07 RX ORDER — PANTOPRAZOLE SODIUM 20 MG/1
20 TABLET, DELAYED RELEASE ORAL DAILY
Qty: 90 TABLET | Refills: 3 | Status: SHIPPED | OUTPATIENT
Start: 2023-12-07

## 2023-12-07 RX ORDER — AZELASTINE 1 MG/ML
2 SPRAY, METERED NASAL 2 TIMES DAILY
Qty: 30 ML | Refills: 3 | Status: SHIPPED | OUTPATIENT
Start: 2023-12-07

## 2023-12-07 NOTE — PROGRESS NOTES
Family Medicine Follow-Up Office Visit  Madeline Lopez 46 y.o. female   MRN: [id number] : 1970  ENCOUNTER: 2023 5:42 PM    Assessment and Plan   Esophageal reflux  Patient with stable symptoms on current regimen with famotidine and Protonix. Continue current regimen, refills are provided at this time. Hypertension  Vitals:    23 1605   BP: 144/74   Pulse: 67   Temp: (!) 96.2 °F (35.7 °C)   SpO2: 100%      Repeat: 126/86    Continue current weight loss and lifestyle management plan through weight watchers  Continue to monitor home blood pressure  Follow-up in 6 months for annual physical    Allergic rhinitis  Symptoms are stable continue Astelin nasal spray regimen      Chief Complaint     Chief Complaint   Patient presents with   • Establish Care       History of Present Illness   Madeline Lopez is a 46y.o.-year-old female with past medical history of hypertension, allergic rhinitis, GERD who presents today to reestablish care after moving back to the area from [place]. She wanted her home blood pressure periodically which has been in normal range for her. It has also been improved since she started weight watchers on  and has lost significant amount of weight. She had had her scheduled follow-up with OB/GYN for routine care. She continues on Pepcid and Protonix for her GERD. She is due for colonoscopy in  and will plan to reestablish with gastroenterology at that time for likely repeat EGD and C-scope. Her allergies are not a significant concern this time of year. Review of Systems   Review of Systems   Constitutional:  Negative for fatigue and fever. HENT:  Negative for congestion and sore throat. Respiratory:  Negative for cough and shortness of breath. Cardiovascular:  Negative for chest pain and palpitations. Gastrointestinal:  Negative for abdominal pain, blood in stool, constipation, diarrhea, nausea and vomiting.    Genitourinary:  Negative for dysuria and hematuria. Musculoskeletal:  Negative for arthralgias and myalgias. Skin:  Negative for rash. Neurological:  Negative for dizziness and headaches. Psychiatric/Behavioral:  Negative for dysphoric mood. The patient is not nervous/anxious. Active Problem List     Patient Active Problem List   Diagnosis   • ACL tear   • Allergic rhinitis   • Anxiety   • Depression   • Esophageal reflux   • Headache   • Hypertension       Past Medical History, Past Surgical History, Family History, and Social History were reviewed and updated today as appropriate. Objective   /74 (BP Location: Left arm, Patient Position: Sitting, Cuff Size: Large)   Pulse 67   Temp (!) 96.2 °F (35.7 °C) (Tympanic)   Ht 5' 1.5" (1.562 m)   Wt 90.4 kg (199 lb 6.4 oz)   SpO2 100%   BMI 37.07 kg/m²     Physical Exam  Vitals reviewed. Constitutional:       Appearance: She is well-developed. Comments: Repeat /86   HENT:      Head: Normocephalic and atraumatic. Right Ear: External ear normal.      Left Ear: External ear normal.   Eyes:      General: No scleral icterus. Conjunctiva/sclera: Conjunctivae normal.   Cardiovascular:      Rate and Rhythm: Normal rate and regular rhythm. Heart sounds: Normal heart sounds. Pulmonary:      Effort: Pulmonary effort is normal. No respiratory distress. Breath sounds: Normal breath sounds. No wheezing. Abdominal:      General: Bowel sounds are normal. There is no distension. Palpations: Abdomen is soft. Tenderness: There is no abdominal tenderness. Musculoskeletal:      Right lower leg: No edema. Left lower leg: No edema. Skin:     General: Skin is warm and dry. Neurological:      General: No focal deficit present. Mental Status: She is alert and oriented to person, place, and time.    Psychiatric:         Mood and Affect: Mood normal.         Behavior: Behavior normal.           Pertinent Laboratory/Diagnostic Studies:  No results found for: "GLUCOSE", "BUN", "CREATININE", "CALCIUM", "NA", "K", "CO2", "CL"  No results found for: "ALT", "AST", "GGT", "ALKPHOS", "BILITOT"    No results found for: "WBC", "HGB", "HCT", "MCV", "PLT"    No results found for: "TSH"    No results found for: "CHOL"  No results found for: "TRIG"  No results found for: "HDL"  No results found for: "LDLCALC"  No results found for: "HGBA1C"    Results for orders placed or performed in visit on 11/15/23   HPV High Risk    Specimen: Thin-Prep Vial   Result Value Ref Range    HPV Other HR Negative Negative    HPV16 Negative Negative    HPV18 Negative Negative   Liquid-based pap, screening   Result Value Ref Range    Case Report       Gynecologic Cytology Report                       Case: [id number]                                  Authorizing Provider:  Melanie Mason PA-C        Collected:           11/15/2023 1500              Ordering Location:     [address] [place] [address]  Received:            11/15/2023 [address] [place] [address]                                                                       First Screen:          [facility]                                                                Specimen:    LIQUID-BASED PAP, SCREENING, Cervix, Endocervical                                          Primary Interpretation Negative for intraepithelial lesion or malignancy     Specimen Adequacy       Satisfactory for evaluation. Endocervical/transformation zone component present. Additional Information       [employer]'s FDA approved ,  and ThinPrep Imaging Duo System are utilized with strict adherence to the 's instruction manual to prepare gynecologic and non-gynecologic cytology specimens for the production of ThinPrep slides as well as for gynecologic ThinPrep imaging. These processes have been validated by our laboratory and/or by the .   The Pap test is not a diagnostic procedure and should not be used as the sole means to detect cervical cancer. It is only a screening procedure to aid in the detection of cervical cancer and its precursors. Both false-negative and false-positive results have been experienced. Your patient's test result should be interpreted in this context together with the history and clinical findings. No orders of the defined types were placed in this encounter. Current Medications     Current Outpatient Medications   Medication Sig Dispense Refill   • amLODIPine (NORVASC) 10 mg tablet Take 1 tablet (10 mg total) by mouth daily 90 tablet 3   • azelastine (ASTELIN) 0.1 % nasal spray 2 sprays into each nostril 2 (two) times a day Use in each nostril as directed 30 mL 3   • famotidine-calcium carbonate-magnesium hydroxide (PEPCID COMPLETE) -165 MG CHEW Chew 1 tablet in the morning 90 tablet 3   • hydrochlorothiazide (HYDRODIURIL) 12.5 mg tablet Take 1 tablet (12.5 mg total) by mouth daily 90 tablet 3   • pantoprazole (PROTONIX) 20 mg tablet Take 1 tablet (20 mg total) by mouth daily 90 tablet 3     No current facility-administered medications for this visit.        ALLERGIES:  Allergies   Allergen Reactions   • Dust Mite Extract Other (See Comments)   • Mixed Ragweed Eye Swelling and Nasal Congestion       Health Maintenance     Health Maintenance   Topic Date Due   • Hepatitis C Screening  Never done   • HIV Screening  Never done   • DTaP,Tdap,and Td Vaccines (3 - Tdap) 10/30/1990   • BMI: Followup Plan  12/09/2020   • COVID-19 Vaccine (1) 11/11/2023   • Colorectal Cancer Screening  08/08/2024   • Annual Physical  11/15/2024   • BMI: Adult  12/07/2024   • Cervical Cancer Screening  11/15/2026   • Osteoporosis Screening  Completed   • Influenza Vaccine  Completed   • Pneumococcal Vaccine: Pediatrics (0 to 5 Years) and At-Risk Patients (6 to 59 Years)  Aged Out   • HIB Vaccine  Aged Out   • IPV Vaccine  Aged Out   • Hepatitis A Vaccine  Aged Out   • Meningococcal ACWY Vaccine  Aged Out   • HPV Vaccine  Aged Out     Immunization History   Administered Date(s) Administered   • DTP 1970, 01/08/1970, 03/13/1970   • INFLUENZA 12/10/2018, 11/24/2019   • IPV 02/12/1970, 04/03/1970, 06/15/1970   • Influenza Injectable, MDCK, Preservative Free, Quadrivalent, 0.5 mL 12/10/2018   • Influenza, injectable, quadrivalent, preservative free 0.5 mL 11/24/2019   • Influenza, seasonal, injectable 11/29/2015   • MMR 09/11/1970, 05/13/1974         Alexandre Chanel, DO   [address]  12/7/2023  5:42 PM    Parts of this note were dictated using [employer] dictation software and may have sounds-like errors due to variation in pronunciation.

## 2023-12-07 NOTE — ASSESSMENT & PLAN NOTE
Vitals:    12/07/23 1605   BP: 144/74   Pulse: 67   Temp: (!) 96.2 °F (35.7 °C)   SpO2: 100%      Repeat: 126/86    Continue current weight loss and lifestyle management plan through weight watchers  Continue to monitor home blood pressure  Follow-up in 6 months for annual physical

## 2023-12-07 NOTE — ASSESSMENT & PLAN NOTE
Patient with stable symptoms on current regimen with famotidine and Protonix. Continue current regimen, refills are provided at this time.

## 2023-12-30 DIAGNOSIS — R09.82 POST-NASAL DRIP: ICD-10-CM

## 2023-12-31 RX ORDER — AZELASTINE HYDROCHLORIDE 137 UG/1
SPRAY, METERED NASAL
Qty: 90 ML | Refills: 2 | Status: SHIPPED | OUTPATIENT
Start: 2023-12-31

## 2024-02-01 ENCOUNTER — HOSPITAL ENCOUNTER (OUTPATIENT)
Dept: MAMMOGRAPHY | Facility: MEDICAL CENTER | Age: 54
Discharge: HOME | End: 2024-02-01
Payer: COMMERCIAL

## 2024-02-01 VITALS — WEIGHT: 199.3 LBS | BODY MASS INDEX: 36.67 KG/M2 | HEIGHT: 62 IN

## 2024-02-01 DIAGNOSIS — Z12.31 ENCOUNTER FOR SCREENING MAMMOGRAM FOR MALIGNANT NEOPLASM OF BREAST: ICD-10-CM

## 2024-02-01 PROCEDURE — 77067 SCR MAMMO BI INCL CAD: CPT

## 2024-02-01 PROCEDURE — 77063 BREAST TOMOSYNTHESIS BI: CPT

## 2024-02-22 ENCOUNTER — OFFICE VISIT (OUTPATIENT)
Dept: FAMILY MEDICINE CLINIC | Facility: CLINIC | Age: 54
End: 2024-02-22
Payer: COMMERCIAL

## 2024-02-22 VITALS
TEMPERATURE: 99 F | HEART RATE: 94 BPM | HEIGHT: 61 IN | DIASTOLIC BLOOD PRESSURE: 77 MMHG | WEIGHT: 207 LBS | BODY MASS INDEX: 39.08 KG/M2 | SYSTOLIC BLOOD PRESSURE: 132 MMHG | OXYGEN SATURATION: 96 %

## 2024-02-22 DIAGNOSIS — F41.9 ANXIETY: Primary | ICD-10-CM

## 2024-02-22 DIAGNOSIS — I10 PRIMARY HYPERTENSION: ICD-10-CM

## 2024-02-22 DIAGNOSIS — E66.9 OBESITY (BMI 30-39.9): ICD-10-CM

## 2024-02-22 DIAGNOSIS — Z11.4 SCREENING FOR HIV (HUMAN IMMUNODEFICIENCY VIRUS): ICD-10-CM

## 2024-02-22 DIAGNOSIS — Z11.59 NEED FOR HEPATITIS C SCREENING TEST: ICD-10-CM

## 2024-02-22 DIAGNOSIS — E55.9 VITAMIN D DEFICIENCY: ICD-10-CM

## 2024-02-22 DIAGNOSIS — R63.5 WEIGHT GAIN: ICD-10-CM

## 2024-02-22 PROCEDURE — 99214 OFFICE O/P EST MOD 30 MIN: CPT | Performed by: FAMILY MEDICINE

## 2024-02-22 RX ORDER — ACETAMINOPHEN 160 MG/5ML
SUSPENSION, ORAL (FINAL DOSE FORM) ORAL
COMMUNITY
Start: 2022-07-18

## 2024-02-22 RX ORDER — FLUOXETINE 10 MG/1
10 CAPSULE ORAL DAILY
Qty: 90 CAPSULE | Refills: 0 | Status: SHIPPED | OUTPATIENT
Start: 2024-02-22

## 2024-02-22 NOTE — PROGRESS NOTES
Family Medicine Follow-Up Office Visit  Bette Mckeon 53 y.o. female   MRN: [id number] : 1970  ENCOUNTER: 2024       Assessment and Plan   1. Anxiety  Assessment & Plan:  Patient notes worsening of symptoms related to anxiety and depression given recent increase in psychosocial stressors leading to increased difficulty with weight loss program.    RAIZA-7 is completed at today's visit with score of 17 indicating severe anxiety  PHQ-9 is completed at today's visit with score of 15 indicating moderately severe depression    Plan:  Plan for additional laboratory workup with CBC, CMP, TSH  Referral is provided to behavioral health therapy for further evaluation and treatment.  After thorough discussion of the benefits, risks, and potential side effects of medication management will start patient on trial of Prozac 10 mg daily  Recommendation for follow-up in 6 weeks or sooner as needed      Orders:  -     Comprehensive metabolic panel; Future  -     CBC and differential; Future  -     Lipid Panel with Direct LDL reflex; Future  -     TSH, 3rd generation with Free T4 reflex; Future  -     FLUoxetine (PROzac) 10 mg capsule; Take 1 capsule (10 mg total) by mouth daily  -     Ambulatory referral to Psych Services; Future    2. Obesity (BMI 30-39.9)  Assessment & Plan:  Patient continues with weight watchers program    Lipid panel is ordered for screening at today's visit  Will also recommend screening hemoglobin A1c  Discussed the importance of management of anxiety and depression as elevated cortisol levels related to psychosocial stressors will inhibit efforts towards weight loss.    Orders:  -     Lipid Panel with Direct LDL reflex; Future  -     Hemoglobin A1C; Future    3. Vitamin D deficiency  Assessment & Plan:  Patient with history of vitamin D deficiency    Repeat labs are ordered for further monitoring    Orders:  -     Vitamin D 25 hydroxy; Future    4. Weight gain  -     Comprehensive metabolic  panel; Future  -     CBC and differential; Future  -     Lipid Panel with Direct LDL reflex; Future  -     TSH, 3rd generation with Free T4 reflex; Future  -     Hemoglobin A1C; Future    5. Primary hypertension  -     Hemoglobin A1C; Future    6. Need for hepatitis C screening test  -     Hepatitis C Antibody; Future    7. Screening for HIV (human immunodeficiency virus)  -     HIV 1/2 AG/AB w Reflex [facility] for 2 yr old and above; Future          Depression Screening and Follow-up Plan: Patient's depression screening was positive with a PHQ-9 score of 15. Patient assessed for underlying major depression. Brief counseling provided and recommend additional follow-up/re-evaluation next office visit.         Chief Complaint     Chief Complaint   Patient presents with   • Anxiety       History of Present Illness   Bette Mckeon is a 53 y.o.-year-old female with past medical history of GERD, allergies, hypertension, RAIZA/MDD, headaches and ACL tear who presents today for evaluation regarding symptoms of anxiety.    Patient notes that in her 20s she struggled with anxiety and was taking Lexapro and Xanax at that time for this.  She notes improvement in his life situations and ended up not continuing on the medications.  She notes significant increase in psychosocial stressors at her current job with deadlines and difficulty shutting her brain off.  Especially at night she cannot stop thinking about work and has difficulty sleeping.  She denies symptoms of panic attacks.  She denies prior hospitalizations for mental health.  She has not worked with a therapist in the past.  She denies any significant side effects with Lexapro or Xanax treatment.  She does note that it has been about 2 years since her last menstrual period and she does still get hot flashes with vasomotor symptoms.  She does feel that anxiety has been worse since this.  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      Review of  "Systems   Review of Systems   Constitutional:  Positive for unexpected weight change. Negative for fatigue and fever.   HENT:  Negative for congestion and sore throat.    Respiratory:  Negative for cough and shortness of breath.    Cardiovascular:  Negative for chest pain and palpitations.   Gastrointestinal:  Negative for abdominal pain, blood in stool, constipation, diarrhea, nausea and vomiting.   Genitourinary:  Negative for dysuria and hematuria.   Musculoskeletal:  Negative for arthralgias and myalgias.   Skin:  Negative for rash.   Neurological:  Negative for dizziness and headaches.   Psychiatric/Behavioral:  Positive for sleep disturbance. Negative for dysphoric mood and suicidal ideas. The patient is nervous/anxious.        Active Problem List     Patient Active Problem List   Diagnosis   • ACL tear   • Allergic rhinitis   • Anxiety   • Depression   • Esophageal reflux   • Headache   • Hypertension   • Obesity (BMI 30-39.9)   • Vitamin D deficiency       Past Medical History, Past Surgical History, Family History, and Social History were reviewed and updated today as appropriate.    Objective   /77 (BP Location: Left arm, Patient Position: Sitting, Cuff Size: Large)   Pulse 94   Temp 99 °F (37.2 °C) (Tympanic)   Ht 5' 1\" (1.549 m)   Wt 93.9 kg (207 lb)   SpO2 96%   BMI 39.11 kg/m²     Physical Exam  Vitals reviewed.   Constitutional:       Appearance: She is well-developed.   HENT:      Head: Normocephalic and atraumatic.      Right Ear: External ear normal.      Left Ear: External ear normal.   Eyes:      General: No scleral icterus.     Conjunctiva/sclera: Conjunctivae normal.   Cardiovascular:      Rate and Rhythm: Normal rate and regular rhythm.      Heart sounds: Normal heart sounds.   Pulmonary:      Effort: Pulmonary effort is normal. No respiratory distress.      Breath sounds: Normal breath sounds. No wheezing.   Abdominal:      General: Bowel sounds are normal. There is no distension. " "     Palpations: Abdomen is soft.      Tenderness: There is no abdominal tenderness.   Musculoskeletal:      Right lower leg: No edema.      Left lower leg: No edema.   Skin:     General: Skin is warm and dry.   Neurological:      General: No focal deficit present.      Mental Status: She is alert and oriented to person, place, and time.   Psychiatric:         Mood and Affect: Mood normal.         Behavior: Behavior normal.         Pertinent Laboratory/Diagnostic Studies:  No results found for: \"GLUCOSE\", \"BUN\", \"CREATININE\", \"CALCIUM\", \"NA\", \"K\", \"CO2\", \"CL\"  No results found for: \"ALT\", \"AST\", \"GGT\", \"ALKPHOS\", \"BILITOT\"    No results found for: \"WBC\", \"HGB\", \"HCT\", \"MCV\", \"PLT\"    No results found for: \"TSH\"    No results found for: \"CHOL\"  No results found for: \"TRIG\"  No results found for: \"HDL\"  No results found for: \"LDLCALC\"  No results found for: \"HGBA1C\"    Results for orders placed or performed in visit on 11/15/23   HPV High Risk    Specimen: Thin-Prep Vial   Result Value Ref Range    HPV Other HR Negative Negative    HPV16 Negative Negative    HPV18 Negative Negative   Liquid-based pap, screening   Result Value Ref Range    Case Report       Gynecologic Cytology Report                       Case: [id number]                                  Authorizing Provider:  Keyana Almaguer PA-C        Collected:           11/15/2023 1507              Ordering Location:     [facility]  Received:            11/15/2023 1506                                     Health                                                                       First Screen:          [facility]                                                                Specimen:    LIQUID-BASED PAP, SCREENING, Cervix, Endocervical                                          Primary Interpretation Negative for intraepithelial lesion or malignancy     Specimen Adequacy       Satisfactory for evaluation. Endocervical/transformation zone component " present.    Additional Information       [facility] FDA approved ,  and ThinPrep Imaging Duo System are utilized with strict adherence to the 's instruction manual to prepare gynecologic and non-gynecologic cytology specimens for the production of ThinPrep slides as well as for gynecologic ThinPrep imaging. These processes have been validated by our laboratory and/or by the .  The Pap test is not a diagnostic procedure and should not be used as the sole means to detect cervical cancer. It is only a screening procedure to aid in the detection of cervical cancer and its precursors. Both false-negative and false-positive results have been experienced. Your patient's test result should be interpreted in this context together with the history and clinical findings.         Orders Placed This Encounter   Procedures   • Hepatitis C Antibody   • HIV 1/2 AG/AB w Reflex [facility] for 2 yr old and above   • Comprehensive metabolic panel   • CBC and differential   • Lipid Panel with Direct LDL reflex   • TSH, 3rd generation with Free T4 reflex   • Vitamin D 25 hydroxy   • Hemoglobin A1C   • Ambulatory referral to Psych Services           Current Medications     Current Outpatient Medications   Medication Sig Dispense Refill   • amLODIPine (NORVASC) 10 mg tablet Take 1 tablet (10 mg total) by mouth daily 90 tablet 3   • Azelastine HCl 137 MCG/SPRAY SOLN SPRAY 2 SPRAYS INTO EACH NOSTRIL 2 TIMES A DAY USE IN EACH NOSTRIL AS DIRECTED 90 mL 2   • famotidine 20 mg/5 mL suspension      • famotidine-calcium carbonate-magnesium hydroxide (PEPCID COMPLETE) -165 MG CHEW Chew 1 tablet in the morning 90 tablet 3   • FLUoxetine (PROzac) 10 mg capsule Take 1 capsule (10 mg total) by mouth daily 90 capsule 0   • hydrochlorothiazide (HYDRODIURIL) 12.5 mg tablet Take 1 tablet (12.5 mg total) by mouth daily 90 tablet 3   • pantoprazole (PROTONIX) 20 mg tablet Take 1 tablet (20 mg total) by mouth daily 90 tablet  3     No current facility-administered medications for this visit.       ALLERGIES:  Allergies   Allergen Reactions   • Dust Mite Extract Other (See Comments)   • Mixed Ragweed Eye Swelling and Nasal Congestion       Health Maintenance     Health Maintenance   Topic Date Due   • Hepatitis C Screening  Never done   • IPV Vaccine (4 of 4 - 4-dose series) 10/30/1973   • HIV Screening  Never done   • DTaP,Tdap,and Td Vaccines (3 - Tdap) 10/30/1990   • Zoster Vaccine (1 of 2) Never done   • COVID-19 Vaccine (4 - 2023-24 season) 01/06/2024   • Colorectal Cancer Screening  08/08/2024   • Annual Physical  11/15/2024   • Breast Cancer Screening: Mammogram  02/01/2025   • Depression Screening  02/22/2025   • Depression Follow-up Plan  02/22/2025   • Cervical Cancer Screening  11/15/2026   • Osteoporosis Screening  Completed   • Influenza Vaccine  Completed   • Pneumococcal Vaccine: Pediatrics (0 to 5 Years) and At-Risk Patients (6 to 64 Years)  Aged Out   • HIB Vaccine  Aged Out   • Hepatitis A Vaccine  Aged Out   • Meningococcal ACWY Vaccine  Aged Out   • HPV Vaccine  Aged Out     Immunization History   Administered Date(s) Administered   • COVID-19 MODERNA VACC 0.5 ML IM 03/06/2021, 04/03/2021   • COVID-19 Pfizer mRNA vacc PF blanca-sucrose 12 yr and older (Comirnaty) 11/11/2023   • DTP 1969, 01/08/1970, 03/13/1970   • INFLUENZA 12/10/2018, 11/24/2019, 11/10/2020, 11/04/2023   • IPV 02/12/1970, 04/03/1970, 06/15/1970   • Influenza Injectable, MDCK, Preservative Free, Quadrivalent, 0.5 mL 12/10/2018   • Influenza, injectable, quadrivalent, preservative free 0.5 mL 11/24/2019, 11/10/2020   • Influenza, seasonal, injectable 11/29/2015   • MMR 09/11/1970, 05/13/1974         Flora Small DO   [facility]  2/23/2024  5:16 PM    Parts of this note were dictated using Dragon dictation software and may have sounds-like errors due to variation in pronunciation.

## 2024-02-23 PROBLEM — E66.9 OBESITY (BMI 30-39.9): Status: ACTIVE | Noted: 2024-02-23

## 2024-02-23 PROBLEM — E55.9 VITAMIN D DEFICIENCY: Status: ACTIVE | Noted: 2024-02-23

## 2024-02-23 NOTE — ASSESSMENT & PLAN NOTE
Patient notes worsening of symptoms related to anxiety and depression given recent increase in psychosocial stressors leading to increased difficulty with weight loss program.    RAIZA-7 is completed at today's visit with score of 17 indicating severe anxiety  PHQ-9 is completed at today's visit with score of 15 indicating moderately severe depression    Plan:  Plan for additional laboratory workup with CBC, CMP, TSH  Referral is provided to behavioral health therapy for further evaluation and treatment.  After thorough discussion of the benefits, risks, and potential side effects of medication management will start patient on trial of Prozac 10 mg daily  Recommendation for follow-up in 6 weeks or sooner as needed

## 2024-02-23 NOTE — ASSESSMENT & PLAN NOTE
Patient continues with weight watchers program    Lipid panel is ordered for screening at today's visit  Will also recommend screening hemoglobin A1c  Discussed the importance of management of anxiety and depression as elevated cortisol levels related to psychosocial stressors will inhibit efforts towards weight loss.

## 2024-03-02 ENCOUNTER — APPOINTMENT (OUTPATIENT)
Dept: LAB | Age: 54
End: 2024-03-02
Payer: COMMERCIAL

## 2024-03-02 DIAGNOSIS — F41.9 ANXIETY: ICD-10-CM

## 2024-03-02 DIAGNOSIS — Z11.4 SCREENING FOR HIV (HUMAN IMMUNODEFICIENCY VIRUS): ICD-10-CM

## 2024-03-02 DIAGNOSIS — E55.9 VITAMIN D DEFICIENCY: ICD-10-CM

## 2024-03-02 DIAGNOSIS — Z11.59 NEED FOR HEPATITIS C SCREENING TEST: ICD-10-CM

## 2024-03-02 DIAGNOSIS — R63.5 WEIGHT GAIN: ICD-10-CM

## 2024-03-02 DIAGNOSIS — E66.9 OBESITY (BMI 30-39.9): ICD-10-CM

## 2024-03-02 DIAGNOSIS — I10 PRIMARY HYPERTENSION: ICD-10-CM

## 2024-03-02 LAB
25(OH)D3 SERPL-MCNC: 28.7 NG/ML (ref 30–100)
ALBUMIN SERPL BCP-MCNC: 4.2 G/DL (ref 3.5–5)
ALP SERPL-CCNC: 76 U/L (ref 34–104)
ALT SERPL W P-5'-P-CCNC: 37 U/L (ref 7–52)
ANION GAP SERPL CALCULATED.3IONS-SCNC: 9 MMOL/L
AST SERPL W P-5'-P-CCNC: 27 U/L (ref 13–39)
BASOPHILS # BLD AUTO: 0.07 THOUSANDS/ÂΜL (ref 0–0.1)
BASOPHILS NFR BLD AUTO: 1 % (ref 0–1)
BILIRUB SERPL-MCNC: 0.65 MG/DL (ref 0.2–1)
BUN SERPL-MCNC: 16 MG/DL (ref 5–25)
CALCIUM SERPL-MCNC: 9.4 MG/DL (ref 8.4–10.2)
CHLORIDE SERPL-SCNC: 101 MMOL/L (ref 96–108)
CHOLEST SERPL-MCNC: 175 MG/DL
CO2 SERPL-SCNC: 31 MMOL/L (ref 21–32)
CREAT SERPL-MCNC: 0.69 MG/DL (ref 0.6–1.3)
EOSINOPHIL # BLD AUTO: 0.1 THOUSAND/ÂΜL (ref 0–0.61)
EOSINOPHIL NFR BLD AUTO: 2 % (ref 0–6)
ERYTHROCYTE [DISTWIDTH] IN BLOOD BY AUTOMATED COUNT: 13.8 % (ref 11.6–15.1)
EST. AVERAGE GLUCOSE BLD GHB EST-MCNC: 117 MG/DL
GFR SERPL CREATININE-BSD FRML MDRD: 99 ML/MIN/1.73SQ M
GLUCOSE P FAST SERPL-MCNC: 91 MG/DL (ref 65–99)
HBA1C MFR BLD: 5.7 %
HCT VFR BLD AUTO: 43.1 % (ref 34.8–46.1)
HCV AB SER QL: NORMAL
HDLC SERPL-MCNC: 38 MG/DL
HGB BLD-MCNC: 13.6 G/DL (ref 11.5–15.4)
HIV 1+2 AB+HIV1 P24 AG SERPL QL IA: NORMAL
HIV 2 AB SERPL QL IA: NORMAL
HIV1 AB SERPL QL IA: NORMAL
HIV1 P24 AG SERPL QL IA: NORMAL
IMM GRANULOCYTES # BLD AUTO: 0.02 THOUSAND/UL (ref 0–0.2)
IMM GRANULOCYTES NFR BLD AUTO: 0 % (ref 0–2)
LDLC SERPL CALC-MCNC: 113 MG/DL (ref 0–100)
LYMPHOCYTES # BLD AUTO: 2.47 THOUSANDS/ÂΜL (ref 0.6–4.47)
LYMPHOCYTES NFR BLD AUTO: 45 % (ref 14–44)
MCH RBC QN AUTO: 27.6 PG (ref 26.8–34.3)
MCHC RBC AUTO-ENTMCNC: 31.6 G/DL (ref 31.4–37.4)
MCV RBC AUTO: 87 FL (ref 82–98)
MONOCYTES # BLD AUTO: 0.43 THOUSAND/ÂΜL (ref 0.17–1.22)
MONOCYTES NFR BLD AUTO: 8 % (ref 4–12)
NEUTROPHILS # BLD AUTO: 2.45 THOUSANDS/ÂΜL (ref 1.85–7.62)
NEUTS SEG NFR BLD AUTO: 44 % (ref 43–75)
NRBC BLD AUTO-RTO: 0 /100 WBCS
PLATELET # BLD AUTO: 329 THOUSANDS/UL (ref 149–390)
PMV BLD AUTO: 9.8 FL (ref 8.9–12.7)
POTASSIUM SERPL-SCNC: 4.3 MMOL/L (ref 3.5–5.3)
PROT SERPL-MCNC: 7.3 G/DL (ref 6.4–8.4)
RBC # BLD AUTO: 4.93 MILLION/UL (ref 3.81–5.12)
SODIUM SERPL-SCNC: 141 MMOL/L (ref 135–147)
T4 FREE SERPL-MCNC: 0.69 NG/DL (ref 0.61–1.12)
TRIGL SERPL-MCNC: 118 MG/DL
TSH SERPL DL<=0.05 MIU/L-ACNC: 6.29 UIU/ML (ref 0.45–4.5)
WBC # BLD AUTO: 5.54 THOUSAND/UL (ref 4.31–10.16)

## 2024-03-02 PROCEDURE — 84443 ASSAY THYROID STIM HORMONE: CPT

## 2024-03-02 PROCEDURE — 80053 COMPREHEN METABOLIC PANEL: CPT

## 2024-03-02 PROCEDURE — 83036 HEMOGLOBIN GLYCOSYLATED A1C: CPT

## 2024-03-02 PROCEDURE — 82306 VITAMIN D 25 HYDROXY: CPT

## 2024-03-02 PROCEDURE — 87389 HIV-1 AG W/HIV-1&-2 AB AG IA: CPT

## 2024-03-02 PROCEDURE — 84439 ASSAY OF FREE THYROXINE: CPT

## 2024-03-02 PROCEDURE — 36415 COLL VENOUS BLD VENIPUNCTURE: CPT

## 2024-03-02 PROCEDURE — 80061 LIPID PANEL: CPT

## 2024-03-02 PROCEDURE — 86803 HEPATITIS C AB TEST: CPT

## 2024-03-02 PROCEDURE — 85025 COMPLETE CBC W/AUTO DIFF WBC: CPT

## 2024-03-08 DIAGNOSIS — E55.9 VITAMIN D INSUFFICIENCY: Primary | ICD-10-CM

## 2024-03-08 RX ORDER — MELATONIN
1000 DAILY
Qty: 90 TABLET | Refills: 3 | Status: SHIPPED | OUTPATIENT
Start: 2024-03-08

## 2024-04-04 ENCOUNTER — OFFICE VISIT (OUTPATIENT)
Dept: FAMILY MEDICINE CLINIC | Facility: CLINIC | Age: 54
End: 2024-04-04
Payer: COMMERCIAL

## 2024-04-04 VITALS
HEIGHT: 61 IN | BODY MASS INDEX: 39.99 KG/M2 | HEART RATE: 82 BPM | DIASTOLIC BLOOD PRESSURE: 86 MMHG | TEMPERATURE: 99 F | SYSTOLIC BLOOD PRESSURE: 128 MMHG | OXYGEN SATURATION: 95 % | WEIGHT: 211.8 LBS

## 2024-04-04 DIAGNOSIS — F41.9 ANXIETY: Primary | ICD-10-CM

## 2024-04-04 DIAGNOSIS — E03.8 SUBCLINICAL HYPOTHYROIDISM: ICD-10-CM

## 2024-04-04 DIAGNOSIS — E55.9 VITAMIN D DEFICIENCY: ICD-10-CM

## 2024-04-04 DIAGNOSIS — R73.03 PREDIABETES: ICD-10-CM

## 2024-04-04 PROCEDURE — 99214 OFFICE O/P EST MOD 30 MIN: CPT | Performed by: FAMILY MEDICINE

## 2024-04-04 RX ORDER — FLUOXETINE HYDROCHLORIDE 20 MG/1
20 CAPSULE ORAL DAILY
Qty: 90 CAPSULE | Refills: 0 | Status: SHIPPED | OUTPATIENT
Start: 2024-04-04

## 2024-04-04 NOTE — PROGRESS NOTES
Family Medicine Follow-Up Office Visit  Bette Mckeon 53 y.o. female   MRN: [id number] : 1970  ENCOUNTER: 2024       Assessment and Plan   1. Anxiety  Assessment & Plan:  Patient notes meant in the symptoms of anxiety and depression however still with persistent anxiety.    RAIZA-7 is completed at today's visit with score of 3 improved from 17  PHQ-9 is completed at today's visit with score of 2 improved from 15     Plan:  Patient will hold off on behavioral health therapy at this time  Increase Prozac from 10 mg daily to 20 mg daily  Recommendation for follow-up in 3 months or sooner as needed    Orders:  -     FLUoxetine (PROzac) 20 mg capsule; Take 1 capsule (20 mg total) by mouth daily    2. Vitamin D deficiency  Assessment & Plan:  Continue with daily Vitamin D supplementation.       3. Subclinical hypothyroidism  Assessment & Plan:  Lab Results   Component Value Date    REM2SQBOVGIG 6.289 (H) 2024    FREET4 0.69 2024       Recommend repeat labs in 6 months      4. Prediabetes  Assessment & Plan:  Lab Results   Component Value Date    HGBA1C 5.7 (H) 2024     Recommend continued lifestyle modifications, repeat labs in 6 months          Depression Screening and Follow-up Plan: Patient was screened for depression during today's encounter. They screened negative with a PHQ-9 score of 2.        Chief Complaint     Chief Complaint   Patient presents with   • Follow-up       History of Present Illness   Bette Mckeon is a 53 y.o.-year-old female with medical history of GERD, allergies, hypertension, RAIZA/MDD who presents today for follow-up regarding mood.    Patient notes significant improvements in his symptoms of depression.  Patient notes that she still feels very anxious though her symptoms are decreased.  She has not been able to establish with a therapist due to scheduling challenges with working and taking care of her father.  She feels that she is more able to handle stressors  "at work.  She has not experienced any adverse side effects with Prozac.    Still very anxious, doesn't feel it is as bad. Has not been able to establish with a therapist. Work and taking care of father limiting factors. Significant improvement at work PHQ - 9 of 2. Feels more able to handle. RAIZA-7 with score of 3. No side effects with the med.     Review of Systems   Review of Systems   Constitutional:  Negative for fatigue and fever.   HENT:  Negative for congestion and sore throat.    Respiratory:  Negative for cough and shortness of breath.    Cardiovascular:  Negative for chest pain and palpitations.   Gastrointestinal:  Negative for abdominal pain, blood in stool, constipation, diarrhea, nausea and vomiting.   Genitourinary:  Negative for dysuria and hematuria.   Musculoskeletal:  Negative for arthralgias and myalgias.   Skin:  Negative for rash.   Neurological:  Negative for dizziness and headaches.   Psychiatric/Behavioral:  Positive for sleep disturbance. Negative for dysphoric mood and suicidal ideas. The patient is nervous/anxious.        Active Problem List     Patient Active Problem List   Diagnosis   • ACL tear   • Allergic rhinitis   • Anxiety   • Depression   • Esophageal reflux   • Headache   • Hypertension   • Obesity (BMI 30-39.9)   • Vitamin D deficiency   • Subclinical hypothyroidism   • Prediabetes       Past Medical History, Past Surgical History, Family History, and Social History were reviewed and updated today as appropriate.    Objective   /86   Pulse 82   Temp 99 °F (37.2 °C)   Ht 5' 1\" (1.549 m)   Wt 96.1 kg (211 lb 12.8 oz)   SpO2 95%   BMI 40.02 kg/m²     Physical Exam  Vitals reviewed.   Constitutional:       Appearance: She is well-developed.   HENT:      Head: Normocephalic and atraumatic.      Right Ear: External ear normal.      Left Ear: External ear normal.   Eyes:      General: No scleral icterus.     Conjunctiva/sclera: Conjunctivae normal.   Cardiovascular:      " "Rate and Rhythm: Normal rate and regular rhythm.      Heart sounds: Normal heart sounds.   Pulmonary:      Effort: Pulmonary effort is normal. No respiratory distress.      Breath sounds: Normal breath sounds. No wheezing.   Abdominal:      General: Bowel sounds are normal. There is no distension.      Palpations: Abdomen is soft.      Tenderness: There is no abdominal tenderness.   Musculoskeletal:      Right lower leg: No edema.      Left lower leg: No edema.   Skin:     General: Skin is warm and dry.   Neurological:      General: No focal deficit present.      Mental Status: She is alert and oriented to person, place, and time.   Psychiatric:         Mood and Affect: Mood normal.         Behavior: Behavior normal.         Pertinent Laboratory/Diagnostic Studies:  Lab Results   Component Value Date    BUN 16 03/02/2024    CREATININE 0.69 03/02/2024    CALCIUM 9.4 03/02/2024    K 4.3 03/02/2024    CO2 31 03/02/2024     03/02/2024     Lab Results   Component Value Date    ALT 37 03/02/2024    AST 27 03/02/2024    ALKPHOS 76 03/02/2024       Lab Results   Component Value Date    WBC 5.54 03/02/2024    HGB 13.6 03/02/2024    HCT 43.1 03/02/2024    MCV 87 03/02/2024     03/02/2024       No results found for: \"TSH\"    No results found for: \"CHOL\"  Lab Results   Component Value Date    TRIG 118 03/02/2024     Lab Results   Component Value Date    HDL 38 (L) 03/02/2024     Lab Results   Component Value Date    LDLCALC 113 (H) 03/02/2024     Lab Results   Component Value Date    HGBA1C 5.7 (H) 03/02/2024       Results for orders placed or performed in visit on 03/02/24   Hepatitis C Antibody   Result Value Ref Range    Hepatitis C Ab Non-reactive Non-Reactive   HIV 1/2 AG/AB w Reflex [facility] for 2 yr old and above   Result Value Ref Range    HIV-1 p24 Antigen Non-Reactive Non-Reactive    HIV-1 Antibody Non-Reactive Non-Reactive    HIV-2 Antibody Non-Reactive Non-Reactive    HIV Ag-Ab 5th Gen Non-Reactive " Non-Reactive   Comprehensive metabolic panel   Result Value Ref Range    Sodium 141 135 - 147 mmol/L    Potassium 4.3 3.5 - 5.3 mmol/L    Chloride 101 96 - 108 mmol/L    CO2 31 21 - 32 mmol/L    ANION GAP 9 mmol/L    BUN 16 5 - 25 mg/dL    Creatinine 0.69 0.60 - 1.30 mg/dL    Glucose, Fasting 91 65 - 99 mg/dL    Calcium 9.4 8.4 - 10.2 mg/dL    AST 27 13 - 39 U/L    ALT 37 7 - 52 U/L    Alkaline Phosphatase 76 34 - 104 U/L    Total Protein 7.3 6.4 - 8.4 g/dL    Albumin 4.2 3.5 - 5.0 g/dL    Total Bilirubin 0.65 0.20 - 1.00 mg/dL    eGFR 99 ml/min/1.73sq m   CBC and differential   Result Value Ref Range    WBC 5.54 4.31 - 10.16 Thousand/uL    RBC 4.93 3.81 - 5.12 Million/uL    Hemoglobin 13.6 11.5 - 15.4 g/dL    Hematocrit 43.1 34.8 - 46.1 %    MCV 87 82 - 98 fL    MCH 27.6 26.8 - 34.3 pg    MCHC 31.6 31.4 - 37.4 g/dL    RDW 13.8 11.6 - 15.1 %    MPV 9.8 8.9 - 12.7 fL    Platelets 329 149 - 390 Thousands/uL    nRBC 0 /100 WBCs    Neutrophils Relative 44 43 - 75 %    Immature Grans % 0 0 - 2 %    Lymphocytes Relative 45 (H) 14 - 44 %    Monocytes Relative 8 4 - 12 %    Eosinophils Relative 2 0 - 6 %    Basophils Relative 1 0 - 1 %    Neutrophils Absolute 2.45 1.85 - 7.62 Thousands/µL    Absolute Immature Grans 0.02 0.00 - 0.20 Thousand/uL    Absolute Lymphocytes 2.47 0.60 - 4.47 Thousands/µL    Absolute Monocytes 0.43 0.17 - 1.22 Thousand/µL    Eosinophils Absolute 0.10 0.00 - 0.61 Thousand/µL    Basophils Absolute 0.07 0.00 - 0.10 Thousands/µL   Lipid Panel with Direct LDL reflex   Result Value Ref Range    Cholesterol 175 See Comment mg/dL    Triglycerides 118 See Comment mg/dL    HDL, Direct 38 (L) >=50 mg/dL    LDL Calculated 113 (H) 0 - 100 mg/dL   TSH, 3rd generation with Free T4 reflex   Result Value Ref Range    TSH 3RD GENERATON 6.289 (H) 0.450 - 4.500 uIU/mL   Vitamin D 25 hydroxy   Result Value Ref Range    Vit D, 25-Hydroxy 28.7 (L) 30.0 - 100.0 ng/mL   Hemoglobin A1C   Result Value Ref Range    Hemoglobin  A1C 5.7 (H) Normal 4.0-5.6%; PreDiabetic 5.7-6.4%; Diabetic >=6.5%; Glycemic control for adults with diabetes <7.0% %     mg/dl   T4, free   Result Value Ref Range    Free T4 0.69 0.61 - 1.12 ng/dL       No orders of the defined types were placed in this encounter.        Current Medications     Current Outpatient Medications   Medication Sig Dispense Refill   • amLODIPine (NORVASC) 10 mg tablet Take 1 tablet (10 mg total) by mouth daily 90 tablet 3   • Azelastine HCl 137 MCG/SPRAY SOLN SPRAY 2 SPRAYS INTO EACH NOSTRIL 2 TIMES A DAY USE IN EACH NOSTRIL AS DIRECTED 90 mL 2   • cholecalciferol (VITAMIN D3) 1,000 units tablet Take 1 tablet (1,000 Units total) by mouth daily 90 tablet 3   • famotidine 20 mg/5 mL suspension      • FLUoxetine (PROzac) 20 mg capsule Take 1 capsule (20 mg total) by mouth daily 90 capsule 0   • hydrochlorothiazide (HYDRODIURIL) 12.5 mg tablet Take 1 tablet (12.5 mg total) by mouth daily 90 tablet 3   • pantoprazole (PROTONIX) 20 mg tablet Take 1 tablet (20 mg total) by mouth daily 90 tablet 3     No current facility-administered medications for this visit.       ALLERGIES:  Allergies   Allergen Reactions   • Dog Epithelium Hives   • Dust Mite Extract Other (See Comments)   • Mixed Ragweed Eye Swelling and Nasal Congestion       Health Maintenance     Health Maintenance   Topic Date Due   • IPV Vaccine (4 of 4 - 4-dose series) 10/30/1973   • DTaP,Tdap,and Td Vaccines (3 - Tdap) 10/30/1990   • Zoster Vaccine (1 of 2) Never done   • COVID-19 Vaccine (4 - 2023-24 season) 01/06/2024   • Colorectal Cancer Screening  08/08/2024   • Annual Physical  11/15/2024   • Breast Cancer Screening: Mammogram  02/01/2025   • Depression Screening  04/04/2025   • Cervical Cancer Screening  11/15/2026   • HIV Screening  Completed   • Hepatitis C Screening  Completed   • Osteoporosis Screening  Completed   • Influenza Vaccine  Completed   • Pneumococcal Vaccine: Pediatrics (0 to 5 Years) and At-Risk  Patients (6 to 64 Years)  Aged Out   • HIB Vaccine  Aged Out   • Hepatitis A Vaccine  Aged Out   • Meningococcal ACWY Vaccine  Aged Out   • HPV Vaccine  Aged Out     Immunization History   Administered Date(s) Administered   • COVID-19 MODERNA VACC 0.5 ML IM 03/06/2021, 04/03/2021   • COVID-19 Pfizer mRNA vacc PF blanca-sucrose 12 yr and older (Comirnaty) 11/11/2023   • DTP 1969, 01/08/1970, 03/13/1970   • INFLUENZA 12/10/2018, 11/24/2019, 11/10/2020, 11/04/2023   • IPV 02/12/1970, 04/03/1970, 06/15/1970   • Influenza Injectable, MDCK, Preservative Free, Quadrivalent, 0.5 mL 12/10/2018   • Influenza, injectable, quadrivalent, preservative free 0.5 mL 11/24/2019, 11/10/2020   • Influenza, seasonal, injectable 11/29/2015   • MMR 09/11/1970, 05/13/1974         Flora Small DO   [facility]  4/5/2024  9:43 AM    Parts of this note were dictated using Dragon dictation software and may have sounds-like errors due to variation in pronunciation.

## 2024-04-05 PROBLEM — R73.03 PREDIABETES: Status: ACTIVE | Noted: 2024-04-05

## 2024-04-05 PROBLEM — E03.8 SUBCLINICAL HYPOTHYROIDISM: Status: ACTIVE | Noted: 2024-04-05

## 2024-04-05 NOTE — ASSESSMENT & PLAN NOTE
Lab Results   Component Value Date    HGBA1C 5.7 (H) 03/02/2024     Recommend continued lifestyle modifications, repeat labs in 6 months

## 2024-04-05 NOTE — ASSESSMENT & PLAN NOTE
Lab Results   Component Value Date    [id number] 6.289 (H) 03/02/2024    FREET4 0.69 03/02/2024       Recommend repeat labs in 6 months

## 2024-04-05 NOTE — ASSESSMENT & PLAN NOTE
Patient notes meant in the symptoms of anxiety and depression however still with persistent anxiety.    RAIZA-7 is completed at today's visit with score of 3 improved from 17  PHQ-9 is completed at today's visit with score of 2 improved from 15     Plan:  Patient will hold off on behavioral health therapy at this time  Increase Prozac from 10 mg daily to 20 mg daily  Recommendation for follow-up in 3 months or sooner as needed

## 2024-06-06 ENCOUNTER — OFFICE VISIT (OUTPATIENT)
Dept: FAMILY MEDICINE CLINIC | Facility: CLINIC | Age: 54
End: 2024-06-06
Payer: COMMERCIAL

## 2024-06-06 VITALS
DIASTOLIC BLOOD PRESSURE: 82 MMHG | SYSTOLIC BLOOD PRESSURE: 110 MMHG | HEIGHT: 61 IN | HEART RATE: 95 BPM | WEIGHT: 217.6 LBS | TEMPERATURE: 98 F | BODY MASS INDEX: 41.08 KG/M2 | OXYGEN SATURATION: 97 %

## 2024-06-06 DIAGNOSIS — R73.03 PREDIABETES: ICD-10-CM

## 2024-06-06 DIAGNOSIS — E55.9 VITAMIN D DEFICIENCY: ICD-10-CM

## 2024-06-06 DIAGNOSIS — E03.8 SUBCLINICAL HYPOTHYROIDISM: Primary | ICD-10-CM

## 2024-06-06 PROCEDURE — 99214 OFFICE O/P EST MOD 30 MIN: CPT | Performed by: FAMILY MEDICINE

## 2024-06-06 NOTE — PROGRESS NOTES
Ambulatory Visit  Name: Bette Mckeon      : 1970      MRN: [id number]  Encounter Provider: Flora Small DO  Encounter Date: 2024   Encounter department: [facility]    Assessment & Plan   1. Subclinical hypothyroidism  Assessment & Plan:  Recommend repeat CBC and TSH for further evaluation of this.  Orders:  -     TSH, 3rd generation with Free T4 reflex; Future  -     CBC and differential; Future  2. Prediabetes  Assessment & Plan:  Recommendation for repeat hemoglobin A1c and lipid panel in approximately 3 months.  Orders:  -     Lipid Panel with Direct LDL reflex; Future  -     Hemoglobin A1C; Future  3. Vitamin D deficiency  Assessment & Plan:  Continue with 1000 IUs of vitamin D daily.  Recommend repeat vitamin D levels.  Orders:  -     Vitamin D 25 hydroxy; Future     Recommend repeat labs in approximately 3 months.  Recommend follow-up in office in approximately 6 months or sooner as needed pending laboratory results or symptomatic needs.    History of Present Illness     Patient notes significant improvement in anxiety related symptoms following dosage increase on Prozac.  She notes that she is not as stressed at work.  Does not get as anxious and has decreased muscular tension related to this.    Patient has noted some weight gain.  She has not continued with weight watchers as she is fallen off with this.        Review of Systems   Constitutional:  Negative for fatigue and fever.   HENT:  Negative for congestion and sore throat.    Respiratory:  Negative for cough and shortness of breath.    Cardiovascular:  Negative for chest pain and palpitations.   Gastrointestinal:  Negative for abdominal pain, blood in stool, constipation, diarrhea, nausea and vomiting.   Genitourinary:  Negative for dysuria and hematuria.   Musculoskeletal:  Negative for arthralgias and myalgias.   Skin:  Negative for rash.   Neurological:  Negative for dizziness and headaches.  "  Psychiatric/Behavioral:  Negative for dysphoric mood and suicidal ideas. The patient is not nervous/anxious.        Objective     /82   Pulse 95   Temp 98 °F (36.7 °C)   Ht 5' 1\" (1.549 m)   Wt 98.7 kg (217 lb 9.6 oz)   SpO2 97%   BMI 41.12 kg/m²     Physical Exam  Vitals reviewed.   Constitutional:       General: She is not in acute distress.     Appearance: She is well-developed.   HENT:      Head: Normocephalic and atraumatic.      Right Ear: External ear normal.      Left Ear: External ear normal.      Nose: Nose normal.   Eyes:      Conjunctiva/sclera: Conjunctivae normal.   Cardiovascular:      Rate and Rhythm: Normal rate and regular rhythm.      Heart sounds: No murmur heard.  Pulmonary:      Effort: Pulmonary effort is normal. No respiratory distress.      Breath sounds: Normal breath sounds.   Abdominal:      Palpations: Abdomen is soft.      Tenderness: There is no abdominal tenderness.   Musculoskeletal:      Cervical back: Neck supple.      Right lower leg: No edema.      Left lower leg: No edema.   Lymphadenopathy:      Cervical: No cervical adenopathy.   Skin:     General: Skin is warm and dry.   Neurological:      General: No focal deficit present.      Mental Status: She is alert and oriented to person, place, and time.   Psychiatric:         Mood and Affect: Mood normal.         Behavior: Behavior normal.       Administrative Statements     "

## 2024-06-07 NOTE — ASSESSMENT & PLAN NOTE
Continue with 1000 IUs of vitamin D daily.  Recommend repeat vitamin D levels.  
Recommend repeat CBC and TSH for further evaluation of this.  
Recommendation for repeat hemoglobin A1c and lipid panel in approximately 3 months.  
unable to assess

## 2024-07-01 DIAGNOSIS — F41.9 ANXIETY: ICD-10-CM

## 2024-07-01 RX ORDER — FLUOXETINE HYDROCHLORIDE 20 MG/1
20 CAPSULE ORAL DAILY
Qty: 90 CAPSULE | Refills: 1 | Status: SHIPPED | OUTPATIENT
Start: 2024-07-01

## 2024-08-10 ENCOUNTER — OFFICE VISIT (OUTPATIENT)
Dept: URGENT CARE | Age: 54
End: 2024-08-10
Payer: COMMERCIAL

## 2024-08-10 ENCOUNTER — APPOINTMENT (OUTPATIENT)
Dept: RADIOLOGY | Age: 54
End: 2024-08-10
Payer: COMMERCIAL

## 2024-08-10 VITALS
OXYGEN SATURATION: 97 % | HEART RATE: 93 BPM | RESPIRATION RATE: 18 BRPM | DIASTOLIC BLOOD PRESSURE: 89 MMHG | SYSTOLIC BLOOD PRESSURE: 143 MMHG | TEMPERATURE: 98.7 F

## 2024-08-10 DIAGNOSIS — J18.9 PNEUMONIA OF LEFT LUNG DUE TO INFECTIOUS ORGANISM, UNSPECIFIED PART OF LUNG: Primary | ICD-10-CM

## 2024-08-10 DIAGNOSIS — R05.1 ACUTE COUGH: ICD-10-CM

## 2024-08-10 PROCEDURE — S9083 URGENT CARE CENTER GLOBAL: HCPCS | Performed by: EMERGENCY MEDICINE

## 2024-08-10 PROCEDURE — 71046 X-RAY EXAM CHEST 2 VIEWS: CPT

## 2024-08-10 PROCEDURE — G0383 LEV 4 HOSP TYPE B ED VISIT: HCPCS | Performed by: EMERGENCY MEDICINE

## 2024-08-10 RX ORDER — AMOXICILLIN AND CLAVULANATE POTASSIUM 875; 125 MG/1; MG/1
1 TABLET, FILM COATED ORAL 2 TIMES DAILY
Qty: 10 TABLET | Refills: 0 | Status: SHIPPED | OUTPATIENT
Start: 2024-08-10 | End: 2024-08-15

## 2024-08-10 RX ORDER — DOXYCYCLINE 100 MG/1
100 TABLET ORAL 2 TIMES DAILY
Qty: 14 TABLET | Refills: 0 | Status: SHIPPED | OUTPATIENT
Start: 2024-08-10 | End: 2024-08-17

## 2024-08-10 RX ORDER — ALBUTEROL SULFATE 90 UG/1
2-4 AEROSOL, METERED RESPIRATORY (INHALATION) EVERY 4 HOURS PRN
Qty: 8.5 G | Refills: 1 | Status: SHIPPED | OUTPATIENT
Start: 2024-08-10 | End: 2024-09-09

## 2024-08-10 NOTE — PROGRESS NOTES
[facility]        NAME: Bette Mckeon is a 53 y.o. female  : 1970    MRN: [id number]  DATE: August 10, 2024  TIME: 12:50 PM    Assessment and Plan   Pneumonia of left lung due to infectious organism, unspecified part of lung [J18.9]  1. Pneumonia of left lung due to infectious organism, unspecified part of lung        2. Acute cough  XR chest pa & lateral    amoxicillin-clavulanate (AUGMENTIN) 875-125 mg per tablet    doxycycline (ADOXA) 100 MG tablet    albuterol (ProAir HFA) 90 mcg/act inhaler      On personal review of chest x-ray, when compared to previous in 2018 there appears to be developing infiltrate noted in the left lower lobe.  Patient also has focal rales and wheezing in this area.  She is otherwise afebrile, nontoxic-appearing and in no acute respiratory or other distress.  Will place patient on antibiotics and trial albuterol inhaler.  Advised patient to seek care in ED if symptoms worsen, otherwise follow-up closely with her PCP.  All questions were answered at bedside, patient was amenable plan and voiced understanding.      Patient Instructions       Follow up with PCP in 3-5 days.  Proceed to  ER if symptoms worsen.    If tests have been performed at [facility], our office will contact you with results if changes need to be made to the care plan discussed with you at the visit.  [facility]    Chief Complaint     Chief Complaint   Patient presents with    Cough     Patient reports cough/congestion x 5 days. She states that cough is dry. Denies any fever or chills. Patient further reports she tested for covid at home and was negative.         History of Present Illness       53-year-old female with history of prediabetes, hypertension, hypothyroidism presents with a chief complaint of worsening cough productive of brownish sputum with associated wheezing and occasional shortness of breath which began 5 days ago.  Patient states  symptoms were mild initially consisting of URI-like symptoms which have since progressively worsened.  She states her cough has worsened and she has occasional wheezing.  She denies acute shortness of breath or difficulty breathing on evaluation.  She additionally denies any chest pain, lightheadedness, fever, abdominal pain or nausea vomiting or diarrhea.  Denies a history of asthma, COPD or tobacco use.    Cough  This is a new problem. The current episode started in the past 7 days. The problem has been unchanged. The problem occurs every few minutes. The cough is Productive of sputum. Associated symptoms include nasal congestion, postnasal drip, rhinorrhea, shortness of breath and wheezing. Pertinent negatives include no chest pain, chills, ear congestion, ear pain, fever, headaches, heartburn, hemoptysis, myalgias, rash, sore throat, sweats or weight loss. The symptoms are aggravated by lying down. Risk factors for lung disease include travel.       Review of Systems   Review of Systems   Constitutional:  Negative for activity change, appetite change, chills, diaphoresis, fatigue, fever, unexpected weight change and weight loss.   HENT:  Positive for congestion, postnasal drip and rhinorrhea. Negative for dental problem, drooling, ear discharge, ear pain, facial swelling, hearing loss, mouth sores, nosebleeds, sinus pressure, sinus pain, sneezing, sore throat, tinnitus, trouble swallowing and voice change.    Respiratory:  Positive for cough, shortness of breath and wheezing. Negative for apnea, hemoptysis, choking, chest tightness and stridor.    Cardiovascular:  Negative for chest pain, palpitations and leg swelling.   Gastrointestinal:  Negative for heartburn.   Musculoskeletal:  Negative for myalgias.   Skin:  Negative for rash.   Neurological:  Negative for dizziness, tremors, seizures, syncope, facial asymmetry, speech difficulty, weakness, light-headedness, numbness and headaches.         Current  Medications       Current Outpatient Medications:     albuterol (ProAir HFA) 90 mcg/act inhaler, Inhale 2-4 puffs every 4 (four) hours as needed for wheezing or shortness of breath (cough), Disp: 8.5 g, Rfl: 1    amLODIPine (NORVASC) 10 mg tablet, Take 1 tablet (10 mg total) by mouth daily, Disp: 90 tablet, Rfl: 3    amoxicillin-clavulanate (AUGMENTIN) 875-125 mg per tablet, Take 1 tablet by mouth 2 (two) times a day for 5 days, Disp: 10 tablet, Rfl: 0    Azelastine HCl 137 MCG/SPRAY SOLN, SPRAY 2 SPRAYS INTO EACH NOSTRIL 2 TIMES A DAY USE IN EACH NOSTRIL AS DIRECTED, Disp: 90 mL, Rfl: 2    cholecalciferol (VITAMIN D3) 1,000 units tablet, Take 1 tablet (1,000 Units total) by mouth daily, Disp: 90 tablet, Rfl: 3    doxycycline (ADOXA) 100 MG tablet, Take 1 tablet (100 mg total) by mouth 2 (two) times a day for 7 days, Disp: 14 tablet, Rfl: 0    famotidine 20 mg/5 mL suspension, , Disp: , Rfl:     FLUoxetine (PROzac) 20 mg capsule, TAKE 1 CAPSULE BY MOUTH EVERY DAY, Disp: 90 capsule, Rfl: 1    hydrochlorothiazide (HYDRODIURIL) 12.5 mg tablet, Take 1 tablet (12.5 mg total) by mouth daily, Disp: 90 tablet, Rfl: 3    pantoprazole (PROTONIX) 20 mg tablet, Take 1 tablet (20 mg total) by mouth daily, Disp: 90 tablet, Rfl: 3    Current Allergies     Allergies as of 08/10/2024 - Reviewed 08/10/2024   Allergen Reaction Noted    Dog epithelium Hives 04/04/2024    Dust mite extract Other (See Comments) 09/11/2002    Mixed ragweed Eye Swelling and Nasal Congestion 08/08/2023            The following portions of the patient's history were reviewed and updated as appropriate: allergies, current medications, past family history, past medical history, past social history, past surgical history and problem list.     Past Medical History:   Diagnosis Date    Allergic     Unknown when I went to the allergist.    Diabetes mellitus (HCC) 02/2011    PRE DM, DIET CONTROLLED    Fractures July 11th, 2023    Broke right foot    GERD  (gastroesophageal reflux disease)     Unknown when I started my medicine.    Hypertension     Unknown when started with high blood pressure    Obesity     I'm currently doing weight watchers, but...    Stomach disorder     Acid Reflux    Varicella        Past Surgical History:   Procedure Laterality Date    KNEE ARTHROSCOPY W/ ACL RECONSTRUCTION AND EPIPHYSEAL HAMSTRING GRAFT  2013    KNEE SURGERY  December 17th, 2003    ACL reconstruction       Family History   Problem Relation Age of Onset    Breast cancer Mother 69        Stage 0, had radiation and was cured.    Cancer Mother         Breast, brain, liver and lung cancer    Hypertension Mother     Cancer Father         Skin cancer    Diabetes Father     Stroke Father         Minor stroke    Hypertension Father     Arthritis Father         He's 88 years old. Has arteritis in his back.    Hearing loss Father         He wears hearing aids    Diabetes Sister         Pre DM    Cancer Sister         Stage 3 Metastatic Melanoma    No Known Problems Sister     No Known Problems Sister     Lung cancer Maternal Grandmother 64    Cancer Maternal Grandmother         Lung cancer    Cancer Maternal Grandfather         Stomach cancer    Stroke Paternal Grandmother         Minor stroke    Hypertension Paternal Grandmother     Lung cancer Paternal Grandfather 65    Cancer Paternal Grandfather         Liver cancer    Cancer Maternal Uncle         Prostate cancer    Prostate cancer Maternal Uncle         He sought treatment and cured or controlled.    Hypertension Sister          Medications have been verified.        Objective   /89   Pulse 93   Temp 98.7 °F (37.1 °C)   Resp 18   SpO2 97%   No LMP recorded. Patient is postmenopausal.       Physical Exam     Physical Exam  Vitals and nursing note reviewed.   Constitutional:       General: She is not in acute distress.     Appearance: Normal appearance. She is normal weight. She is not ill-appearing, toxic-appearing or  diaphoretic.   HENT:      Head: Normocephalic and atraumatic.      Right Ear: Tympanic membrane, ear canal and external ear normal. There is no impacted cerumen.      Left Ear: Tympanic membrane, ear canal and external ear normal. There is no impacted cerumen.      Nose: Nose normal. No congestion or rhinorrhea.      Mouth/Throat:      Mouth: Mucous membranes are moist.      Pharynx: No oropharyngeal exudate or posterior oropharyngeal erythema.   Eyes:      General: No scleral icterus.        Right eye: No discharge.         Left eye: No discharge.      Extraocular Movements: Extraocular movements intact.      Pupils: Pupils are equal, round, and reactive to light.   Cardiovascular:      Rate and Rhythm: Normal rate and regular rhythm.      Pulses: Normal pulses.           Carotid pulses are 2+ on the right side and 2+ on the left side.       Radial pulses are 2+ on the right side and 2+ on the left side.      Heart sounds: No murmur heard.     No friction rub. No gallop.   Pulmonary:      Effort: Pulmonary effort is normal. No tachypnea, bradypnea, accessory muscle usage, prolonged expiration or respiratory distress.      Breath sounds: No stridor. Examination of the left-middle field reveals wheezing and rales. Examination of the left-lower field reveals wheezing and rales. Wheezing and rales present. No rhonchi.   Chest:      Chest wall: No tenderness.   Abdominal:      General: Abdomen is flat. There is no distension.      Palpations: Abdomen is soft. There is no mass.      Tenderness: There is no abdominal tenderness. There is no right CVA tenderness, left CVA tenderness, guarding or rebound.      Hernia: No hernia is present.   Musculoskeletal:         General: No swelling, tenderness, deformity or signs of injury.      Cervical back: Normal range of motion and neck supple.      Right lower leg: No edema.      Left lower leg: No edema.   Skin:     General: Skin is warm and dry.      Capillary Refill: Capillary  refill takes less than 2 seconds.      Coloration: Skin is not jaundiced or pale.      Findings: No bruising, erythema, lesion or rash.   Neurological:      General: No focal deficit present.      Mental Status: She is alert and oriented to person, place, and time.      Cranial Nerves: No cranial nerve deficit.      Sensory: No sensory deficit.      Motor: No weakness.      Coordination: Coordination normal.      Gait: Gait normal.   Psychiatric:         Mood and Affect: Mood normal.         Behavior: Behavior normal.

## 2024-08-15 ENCOUNTER — OFFICE VISIT (OUTPATIENT)
Dept: FAMILY MEDICINE CLINIC | Facility: CLINIC | Age: 54
End: 2024-08-15
Payer: COMMERCIAL

## 2024-08-15 VITALS
WEIGHT: 218.4 LBS | BODY MASS INDEX: 41.23 KG/M2 | DIASTOLIC BLOOD PRESSURE: 80 MMHG | HEIGHT: 61 IN | TEMPERATURE: 98.6 F | HEART RATE: 84 BPM | OXYGEN SATURATION: 94 % | SYSTOLIC BLOOD PRESSURE: 126 MMHG

## 2024-08-15 DIAGNOSIS — R05.1 ACUTE COUGH: Primary | ICD-10-CM

## 2024-08-15 PROCEDURE — 99213 OFFICE O/P EST LOW 20 MIN: CPT | Performed by: FAMILY MEDICINE

## 2024-08-15 RX ORDER — FLUTICASONE PROPIONATE 50 MCG
1 SPRAY, SUSPENSION (ML) NASAL DAILY
Qty: 9.9 ML | Refills: 0 | Status: SHIPPED | OUTPATIENT
Start: 2024-08-15

## 2024-08-15 RX ORDER — BENZONATATE 200 MG/1
200 CAPSULE ORAL 3 TIMES DAILY PRN
Qty: 20 CAPSULE | Refills: 0 | Status: SHIPPED | OUTPATIENT
Start: 2024-08-15

## 2024-08-15 NOTE — PROGRESS NOTES
Ambulatory Visit  Name: Bette Mckeon      : 1970      MRN: [id number]  Encounter Provider: Flora Small DO  Encounter Date: 8/15/2024   Encounter department: [facility]    Assessment & Plan   1. Acute cough  Assessment & Plan:  Patient with signs and symptoms consistent with postinfectious cough.  Recommend trial of Tessalon Perles as needed for symptomatic relief of cough.  Recommend Flonase daily for treatment of postnasal drip and to aid with cough management.    Follow up in 1-2 weeks or sooner as needed should symptoms persist or worsen    Orders:  -     benzonatate (TESSALON) 200 MG capsule; Take 1 capsule (200 mg total) by mouth 3 (three) times a day as needed for cough  -     fluticasone (FLONASE) 50 mcg/act nasal spray; 1 spray into each nostril daily     History of Present Illness     Hollie is a 53-year-old female with past medical history of hypertension, seasonal allergies, GERD who presents today for follow-up regarding cough.  Patient was evaluated at an urgent care on 8/10 and diagnosed with suspected early pneumonia.  Patient was treated with 5-day course of Augmentin and 7-day course of doxycycline and she does note improvement of symptoms with treatment.  She notes that she continues to cough up clear mucus.  She denies chest pain, shortness of breath other than with cough.  She denies rhinorrhea or sore throat.  She does continue with postnasal drip.  She notes that she has been utilizing the albuterol inhaler without significant relief of symptoms.  He does not have any known history of asthma.    Cough  This is a recurrent problem. The current episode started 1 to 4 weeks ago. The problem has been gradually improving. The problem occurs every few minutes. The cough is Non-productive and productive of sputum. Associated symptoms include headaches, nasal congestion, postnasal drip and wheezing. Pertinent negatives include no chest pain, chills, ear  "congestion, ear pain, fever, heartburn, hemoptysis, myalgias, rash, rhinorrhea, sore throat, shortness of breath or weight loss.       Review of Systems   Constitutional:  Negative for chills, fever and weight loss.   HENT:  Positive for postnasal drip. Negative for ear pain, rhinorrhea and sore throat.    Respiratory:  Positive for cough and wheezing. Negative for hemoptysis and shortness of breath.    Cardiovascular:  Negative for chest pain.   Gastrointestinal:  Negative for heartburn.   Musculoskeletal:  Negative for myalgias.   Skin:  Negative for rash.   Neurological:  Positive for headaches.       Objective     /80 (BP Location: Left arm, Patient Position: Sitting, Cuff Size: Standard)   Pulse 84   Temp 98.6 °F (37 °C) (Tympanic)   Ht 5' 1\" (1.549 m)   Wt 99.1 kg (218 lb 6.4 oz)   SpO2 94%   BMI 41.27 kg/m²     Physical Exam  Vitals reviewed.   Constitutional:       General: She is not in acute distress.     Appearance: She is well-developed.   HENT:      Head: Normocephalic and atraumatic.      Right Ear: Tympanic membrane, ear canal and external ear normal.      Left Ear: Tympanic membrane, ear canal and external ear normal.      Nose: Nose normal.      Mouth/Throat:      Mouth: Mucous membranes are moist.      Pharynx: Oropharynx is clear.   Eyes:      Conjunctiva/sclera: Conjunctivae normal.      Pupils: Pupils are equal, round, and reactive to light.   Cardiovascular:      Rate and Rhythm: Normal rate and regular rhythm.      Heart sounds: No murmur heard.  Pulmonary:      Effort: Pulmonary effort is normal. No respiratory distress.      Breath sounds: Normal breath sounds.   Abdominal:      Palpations: Abdomen is soft.      Tenderness: There is no abdominal tenderness.   Musculoskeletal:      Cervical back: Neck supple.      Right lower leg: No edema.      Left lower leg: No edema.   Lymphadenopathy:      Cervical: No cervical adenopathy.   Skin:     General: Skin is warm and dry. "   Neurological:      General: No focal deficit present.      Mental Status: She is alert and oriented to person, place, and time.   Psychiatric:         Mood and Affect: Mood normal.         Behavior: Behavior normal.       Administrative Statements

## 2024-08-18 PROBLEM — R05.1 ACUTE COUGH: Status: ACTIVE | Noted: 2024-08-18

## 2024-08-18 NOTE — ASSESSMENT & PLAN NOTE
Patient with signs and symptoms consistent with postinfectious cough.  Recommend trial of Tessalon Perles as needed for symptomatic relief of cough.  Recommend Flonase daily for treatment of postnasal drip and to aid with cough management.    Follow up in 1-2 weeks or sooner as needed should symptoms persist or worsen

## 2024-09-06 ENCOUNTER — OFFICE VISIT (OUTPATIENT)
Dept: FAMILY MEDICINE CLINIC | Facility: CLINIC | Age: 54
End: 2024-09-06
Payer: COMMERCIAL

## 2024-09-06 VITALS
HEIGHT: 61 IN | WEIGHT: 220 LBS | DIASTOLIC BLOOD PRESSURE: 75 MMHG | OXYGEN SATURATION: 96 % | TEMPERATURE: 97.9 F | BODY MASS INDEX: 41.54 KG/M2 | HEART RATE: 90 BPM | SYSTOLIC BLOOD PRESSURE: 137 MMHG

## 2024-09-06 DIAGNOSIS — R05.2 SUBACUTE COUGH: Primary | ICD-10-CM

## 2024-09-06 PROCEDURE — 99213 OFFICE O/P EST LOW 20 MIN: CPT | Performed by: FAMILY MEDICINE

## 2024-09-06 NOTE — PROGRESS NOTES
Ambulatory Visit  Name: Bette Mckeon      : 1970      MRN: [id number]  Encounter Provider: Flora Small DO  Encounter Date: 2024   Encounter department: [facility]    Assessment & Plan   1. Subacute cough  Assessment & Plan:  Patient with signs and symptoms of recurrent cough.  Suspect allergic etiology due to lack of symptoms while she was in [place].  Discussed trial of alternate antihistamine including Xyzal or Allegra.  Recommend completion of previously ordered laboratory workup.  Could consider the addition of Singulair as needed.  Referral is provided to allergist for further evaluation and treatment.  Discussed that if changing her antihistamine is not effective over the next 1 to 2 weeks could consider increasing her reflux medication to 40 mg Protonix daily to see if this would help.    Recommend follow-up in 3 months for annual physical or sooner as needed.  Orders:  -     Ambulatory Referral to Allergy; Future     History of Present Illness     Patient notes initial improvement of her previous postinfectious cough.  She notes that she got back from [place] about 1 week ago and noted start of symptoms.  She has been taking Zyrtec with Flonase and azelastine without significant relief of symptoms.  She has been using cough drops as needed.    Cough  This is a recurrent problem. The current episode started in the past 7 days. The problem has been gradually worsening. The problem occurs every few hours. The cough is Productive of sputum. Associated symptoms include postnasal drip. Pertinent negatives include no chest pain, chills, ear congestion, ear pain, fever, headaches, heartburn, hemoptysis, myalgias, nasal congestion, rash, rhinorrhea, sore throat, shortness of breath, sweats, weight loss or wheezing. The symptoms are aggravated by nothing and dust. Nothing and dust aggravates the symptoms.       Review of Systems   Constitutional:  Negative for chills, fever and  weight loss.   HENT:  Positive for postnasal drip. Negative for ear pain, rhinorrhea and sore throat.    Respiratory:  Positive for cough. Negative for hemoptysis, shortness of breath and wheezing.    Cardiovascular:  Negative for chest pain.   Gastrointestinal:  Negative for heartburn.   Musculoskeletal:  Negative for myalgias.   Skin:  Negative for rash.   Neurological:  Negative for headaches.     Medical History Reviewed by provider this encounter:       Past Medical History   Past Medical History:   Diagnosis Date    Allergic     Unknown when I went to the allergist.    Diabetes mellitus (HCC) 02/2011    PRE DM, DIET CONTROLLED    Fractures July 11th, 2023    Broke right foot    GERD (gastroesophageal reflux disease)     Unknown when I started my medicine.    Hypertension     Unknown when started with high blood pressure    Obesity     I'm currently doing weight watchers, but...    Stomach disorder     Acid Reflux    Varicella      Past Surgical History:   Procedure Laterality Date    KNEE ARTHROSCOPY W/ ACL RECONSTRUCTION AND EPIPHYSEAL HAMSTRING GRAFT  2013    KNEE SURGERY  December 17th, 2003    ACL reconstruction     Family History   Problem Relation Age of Onset    Breast cancer Mother 69        Stage 0, had radiation and was cured.    Cancer Mother         Breast, brain, liver and lung cancer    Hypertension Mother     Cancer Father         Skin cancer    Diabetes Father     Stroke Father         Minor stroke    Hypertension Father     Arthritis Father         He's 88 years old. Has arteritis in his back.    Hearing loss Father         He wears hearing aids    Diabetes Sister         Pre DM    Cancer Sister         Stage 3 Metastatic Melanoma    No Known Problems Sister     No Known Problems Sister     Lung cancer Maternal Grandmother 64    Cancer Maternal Grandmother         Lung cancer    Cancer Maternal Grandfather         Stomach cancer    Stroke Paternal Grandmother         Minor stroke     Hypertension Paternal Grandmother     Lung cancer Paternal Grandfather 65    Cancer Paternal Grandfather         Liver cancer    Cancer Maternal Uncle         Prostate cancer    Prostate cancer Maternal Uncle         He sought treatment and cured or controlled.    Hypertension Sister      Current Outpatient Medications on File Prior to Visit   Medication Sig Dispense Refill    [] albuterol (ProAir HFA) 90 mcg/act inhaler Inhale 2-4 puffs every 4 (four) hours as needed for wheezing or shortness of breath (cough) 8.5 g 1    amLODIPine (NORVASC) 10 mg tablet Take 1 tablet (10 mg total) by mouth daily 90 tablet 3    Azelastine HCl 137 MCG/SPRAY SOLN SPRAY 2 SPRAYS INTO EACH NOSTRIL 2 TIMES A DAY USE IN EACH NOSTRIL AS DIRECTED 90 mL 2    benzonatate (TESSALON) 200 MG capsule Take 1 capsule (200 mg total) by mouth 3 (three) times a day as needed for cough 20 capsule 0    cholecalciferol (VITAMIN D3) 1,000 units tablet Take 1 tablet (1,000 Units total) by mouth daily 90 tablet 3    famotidine 20 mg/5 mL suspension       FLUoxetine (PROzac) 20 mg capsule TAKE 1 CAPSULE BY MOUTH EVERY DAY 90 capsule 1    fluticasone (FLONASE) 50 mcg/act nasal spray 1 spray into each nostril daily 9.9 mL 0    hydrochlorothiazide (HYDRODIURIL) 12.5 mg tablet Take 1 tablet (12.5 mg total) by mouth daily 90 tablet 3    pantoprazole (PROTONIX) 20 mg tablet Take 1 tablet (20 mg total) by mouth daily 90 tablet 3     No current facility-administered medications on file prior to visit.     Allergies   Allergen Reactions    Dog Epithelium Hives    Dust Mite Extract Other (See Comments)    Mixed Ragweed Eye Swelling and Nasal Congestion      Current Outpatient Medications on File Prior to Visit   Medication Sig Dispense Refill    [] albuterol (ProAir HFA) 90 mcg/act inhaler Inhale 2-4 puffs every 4 (four) hours as needed for wheezing or shortness of breath (cough) 8.5 g 1    amLODIPine (NORVASC) 10 mg tablet Take 1 tablet (10 mg total)  "by mouth daily 90 tablet 3    Azelastine HCl 137 MCG/SPRAY SOLN SPRAY 2 SPRAYS INTO EACH NOSTRIL 2 TIMES A DAY USE IN EACH NOSTRIL AS DIRECTED 90 mL 2    benzonatate (TESSALON) 200 MG capsule Take 1 capsule (200 mg total) by mouth 3 (three) times a day as needed for cough 20 capsule 0    cholecalciferol (VITAMIN D3) 1,000 units tablet Take 1 tablet (1,000 Units total) by mouth daily 90 tablet 3    famotidine 20 mg/5 mL suspension       FLUoxetine (PROzac) 20 mg capsule TAKE 1 CAPSULE BY MOUTH EVERY DAY 90 capsule 1    fluticasone (FLONASE) 50 mcg/act nasal spray 1 spray into each nostril daily 9.9 mL 0    hydrochlorothiazide (HYDRODIURIL) 12.5 mg tablet Take 1 tablet (12.5 mg total) by mouth daily 90 tablet 3    pantoprazole (PROTONIX) 20 mg tablet Take 1 tablet (20 mg total) by mouth daily 90 tablet 3     No current facility-administered medications on file prior to visit.      Social History     Tobacco Use    Smoking status: Never    Smokeless tobacco: Never   Vaping Use    Vaping status: Never Used   Substance and Sexual Activity    Alcohol use: Yes     Comment: Maybe drink once a month    Drug use: No    Sexual activity: Yes     Partners: Male     Birth control/protection: Post-menopausal, None     Comment: I haven't had a period since 4/18/2022     Objective     /75 (BP Location: Left arm, Patient Position: Sitting, Cuff Size: Standard)   Pulse 90   Temp 97.9 °F (36.6 °C) (Tympanic)   Ht 5' 1\" (1.549 m)   Wt 99.8 kg (220 lb)   SpO2 96%   BMI 41.57 kg/m²     Physical Exam  Vitals reviewed.   Constitutional:       General: She is not in acute distress.     Appearance: She is well-developed.   HENT:      Head: Normocephalic and atraumatic.      Right Ear: Tympanic membrane, ear canal and external ear normal.      Left Ear: Tympanic membrane, ear canal and external ear normal.      Nose: Nose normal.      Mouth/Throat:      Mouth: Mucous membranes are moist.      Pharynx: Oropharynx is clear.   Eyes: "      Conjunctiva/sclera: Conjunctivae normal.      Pupils: Pupils are equal, round, and reactive to light.   Cardiovascular:      Rate and Rhythm: Normal rate and regular rhythm.      Heart sounds: No murmur heard.  Pulmonary:      Effort: Pulmonary effort is normal. No respiratory distress.      Breath sounds: Normal breath sounds.   Abdominal:      Palpations: Abdomen is soft.      Tenderness: There is no abdominal tenderness.   Musculoskeletal:      Cervical back: Neck supple.      Right lower leg: No edema.      Left lower leg: No edema.   Lymphadenopathy:      Cervical: No cervical adenopathy.   Skin:     General: Skin is warm and dry.   Neurological:      General: No focal deficit present.      Mental Status: She is alert and oriented to person, place, and time.   Psychiatric:         Mood and Affect: Mood normal.         Behavior: Behavior normal.       Administrative Statements

## 2024-09-07 ENCOUNTER — APPOINTMENT (OUTPATIENT)
Dept: LAB | Age: 54
End: 2024-09-07
Payer: COMMERCIAL

## 2024-09-07 DIAGNOSIS — E55.9 VITAMIN D DEFICIENCY: ICD-10-CM

## 2024-09-07 DIAGNOSIS — R73.03 PREDIABETES: ICD-10-CM

## 2024-09-07 DIAGNOSIS — E03.8 SUBCLINICAL HYPOTHYROIDISM: ICD-10-CM

## 2024-09-07 PROCEDURE — 85025 COMPLETE CBC W/AUTO DIFF WBC: CPT

## 2024-09-07 PROCEDURE — 80061 LIPID PANEL: CPT

## 2024-09-07 PROCEDURE — 82306 VITAMIN D 25 HYDROXY: CPT

## 2024-09-07 PROCEDURE — 84443 ASSAY THYROID STIM HORMONE: CPT

## 2024-09-07 PROCEDURE — 36415 COLL VENOUS BLD VENIPUNCTURE: CPT

## 2024-09-07 PROCEDURE — 83036 HEMOGLOBIN GLYCOSYLATED A1C: CPT

## 2024-09-09 LAB
25(OH)D3 SERPL-MCNC: 38.6 NG/ML (ref 30–100)
BASOPHILS # BLD AUTO: 0.08 THOUSANDS/ÂΜL (ref 0–0.1)
BASOPHILS NFR BLD AUTO: 1 % (ref 0–1)
CHOLEST SERPL-MCNC: 181 MG/DL
EOSINOPHIL # BLD AUTO: 0.22 THOUSAND/ÂΜL (ref 0–0.61)
EOSINOPHIL NFR BLD AUTO: 4 % (ref 0–6)
ERYTHROCYTE [DISTWIDTH] IN BLOOD BY AUTOMATED COUNT: 14 % (ref 11.6–15.1)
EST. AVERAGE GLUCOSE BLD GHB EST-MCNC: 114 MG/DL
HBA1C MFR BLD: 5.6 %
HCT VFR BLD AUTO: 42.2 % (ref 34.8–46.1)
HDLC SERPL-MCNC: 43 MG/DL
HGB BLD-MCNC: 13.6 G/DL (ref 11.5–15.4)
IMM GRANULOCYTES # BLD AUTO: 0.04 THOUSAND/UL (ref 0–0.2)
IMM GRANULOCYTES NFR BLD AUTO: 1 % (ref 0–2)
LDLC SERPL CALC-MCNC: 109 MG/DL (ref 0–100)
LYMPHOCYTES # BLD AUTO: 2.25 THOUSANDS/ÂΜL (ref 0.6–4.47)
LYMPHOCYTES NFR BLD AUTO: 36 % (ref 14–44)
MCH RBC QN AUTO: 27.1 PG (ref 26.8–34.3)
MCHC RBC AUTO-ENTMCNC: 32.2 G/DL (ref 31.4–37.4)
MCV RBC AUTO: 84 FL (ref 82–98)
MONOCYTES # BLD AUTO: 0.58 THOUSAND/ÂΜL (ref 0.17–1.22)
MONOCYTES NFR BLD AUTO: 9 % (ref 4–12)
NEUTROPHILS # BLD AUTO: 3.07 THOUSANDS/ÂΜL (ref 1.85–7.62)
NEUTS SEG NFR BLD AUTO: 49 % (ref 43–75)
NRBC BLD AUTO-RTO: 0 /100 WBCS
PLATELET # BLD AUTO: 344 THOUSANDS/UL (ref 149–390)
PMV BLD AUTO: 9.6 FL (ref 8.9–12.7)
RBC # BLD AUTO: 5.01 MILLION/UL (ref 3.81–5.12)
TRIGL SERPL-MCNC: 147 MG/DL
TSH SERPL DL<=0.05 MIU/L-ACNC: 3.06 UIU/ML (ref 0.45–4.5)
WBC # BLD AUTO: 6.24 THOUSAND/UL (ref 4.31–10.16)

## 2024-09-10 PROBLEM — R05.2 SUBACUTE COUGH: Status: ACTIVE | Noted: 2024-08-18

## 2024-09-10 NOTE — ASSESSMENT & PLAN NOTE
Patient with signs and symptoms of recurrent cough.  Suspect allergic etiology due to lack of symptoms while she was in [place].  Discussed trial of alternate antihistamine including Xyzal or Allegra.  Recommend completion of previously ordered laboratory workup.  Could consider the addition of Singulair as needed.  Referral is provided to allergist for further evaluation and treatment.  Discussed that if changing her antihistamine is not effective over the next 1 to 2 weeks could consider increasing her reflux medication to 40 mg Protonix daily to see if this would help.    Recommend follow-up in 3 months for annual physical or sooner as needed.

## 2024-10-10 PROBLEM — R05.2 SUBACUTE COUGH: Status: RESOLVED | Noted: 2024-08-18 | Resolved: 2024-10-10

## 2024-11-29 ENCOUNTER — OFFICE VISIT (OUTPATIENT)
Dept: OBGYN CLINIC | Facility: CLINIC | Age: 54
End: 2024-11-29
Payer: COMMERCIAL

## 2024-11-29 VITALS
HEIGHT: 61 IN | HEART RATE: 98 BPM | OXYGEN SATURATION: 96 % | SYSTOLIC BLOOD PRESSURE: 122 MMHG | DIASTOLIC BLOOD PRESSURE: 78 MMHG | BODY MASS INDEX: 41.57 KG/M2 | WEIGHT: 220.2 LBS

## 2024-11-29 DIAGNOSIS — N95.1 MENOPAUSAL SYMPTOMS: Primary | ICD-10-CM

## 2024-11-29 DIAGNOSIS — Z12.31 ENCOUNTER FOR SCREENING MAMMOGRAM FOR MALIGNANT NEOPLASM OF BREAST: ICD-10-CM

## 2024-11-29 PROCEDURE — 99213 OFFICE O/P EST LOW 20 MIN: CPT | Performed by: OBSTETRICS & GYNECOLOGY

## 2024-11-29 NOTE — PROGRESS NOTES
"Subjective    Patient is a 53 yo postmenopausal G0 who presents today to discuss menopausal symptoms. She reports hot flashes, which are mild. She also reports hair thinning and weight gain. She denies any bleeding for the past 2 years, when menopause started. She sleeps well, but has started snoring, which she attributes to weight gain. She otherwise feels well and has no other complaints.    OB History          0    Para   0    Term   0       0    AB   0    Living   0         SAB   0    IAB   0    Ectopic   0    Multiple   0    Live Births   0           Obstetric Comments   Patient states she had a \"phantom pregnancy\".  Menarche 12   Post menopausal 2022                       Objective    Vitals:    24 1353   BP: 122/78   BP Location: Right arm   Patient Position: Sitting   Cuff Size: Large   Pulse: 98   SpO2: 96%   Weight: 99.9 kg (220 lb 3.2 oz)   Height: 5' 1\" (1.549 m)        Physical Exam  Constitutional:       Appearance: She is well-developed.   Cardiovascular:      Rate and Rhythm: Normal rate.   Pulmonary:      Effort: Pulmonary effort is normal. No respiratory distress.   Skin:     General: Skin is warm and dry.          Assessment    Patient Active Problem List   Diagnosis    ACL tear    Allergic rhinitis    Anxiety    Depression    Esophageal reflux    Headache    Hypertension    Obesity (BMI 30-39.9)    Vitamin D deficiency    Subclinical hypothyroidism    Prediabetes        Plan  - Reviewed possible treatment for hot flashes - patient declines at this time, as her symptoms are manageable  - Reviewed rogaine for hair loss  - Patient has upcoming appointment had Weight Management  - Discussed using OTC vaginal hydrators for vaginal dryness  - Mammogram ordered  - Return for annual exam  "

## 2024-12-05 DIAGNOSIS — K21.9 GASTROESOPHAGEAL REFLUX DISEASE, UNSPECIFIED WHETHER ESOPHAGITIS PRESENT: ICD-10-CM

## 2024-12-05 RX ORDER — PANTOPRAZOLE SODIUM 20 MG/1
20 TABLET, DELAYED RELEASE ORAL DAILY
Qty: 90 TABLET | Refills: 1 | Status: SHIPPED | OUTPATIENT
Start: 2024-12-05

## 2024-12-06 ENCOUNTER — OFFICE VISIT (OUTPATIENT)
Dept: FAMILY MEDICINE CLINIC | Facility: CLINIC | Age: 54
End: 2024-12-06
Payer: COMMERCIAL

## 2024-12-06 VITALS
SYSTOLIC BLOOD PRESSURE: 132 MMHG | WEIGHT: 224 LBS | HEIGHT: 61 IN | TEMPERATURE: 98.5 F | OXYGEN SATURATION: 93 % | DIASTOLIC BLOOD PRESSURE: 78 MMHG | BODY MASS INDEX: 42.29 KG/M2 | HEART RATE: 100 BPM

## 2024-12-06 DIAGNOSIS — F41.9 ANXIETY: Primary | ICD-10-CM

## 2024-12-06 DIAGNOSIS — Z23 IMMUNIZATION DUE: ICD-10-CM

## 2024-12-06 DIAGNOSIS — Z12.11 COLON CANCER SCREENING: ICD-10-CM

## 2024-12-06 PROCEDURE — 99213 OFFICE O/P EST LOW 20 MIN: CPT | Performed by: FAMILY MEDICINE

## 2024-12-06 PROCEDURE — 90750 HZV VACC RECOMBINANT IM: CPT

## 2024-12-06 PROCEDURE — 90471 IMMUNIZATION ADMIN: CPT

## 2024-12-06 RX ORDER — FLUOXETINE 10 MG/1
10 CAPSULE ORAL DAILY
Qty: 90 CAPSULE | Refills: 1 | Status: SHIPPED | OUTPATIENT
Start: 2024-12-06

## 2024-12-06 NOTE — PROGRESS NOTES
Name: Bette Mckeon      : 1970      MRN: [id number]  Encounter Provider: Flora Small DO  Encounter Date: 2024   Encounter department: [facility]  :  Assessment & Plan  Anxiety    RAIZA-7 is completed today's visit with score of 9 indicating mild to moderate anxiety.  Given work related stressors recommend trial of increased dosage on Prozac to 30 mg daily.  Recommend follow-up in 6 months or sooner as needed should symptoms persist or worsen.    Orders:  •  FLUoxetine (PROzac) 20 mg capsule; Take 1 capsule (20 mg total) by mouth daily  •  FLUoxetine (PROzac) 10 mg capsule; Take 1 capsule (10 mg total) by mouth daily    Colon cancer screening    Patient is due for repeat colonoscopy referral is provided to gastroenterology.    Orders:  •  Ambulatory referral to Gastroenterology; Future    Immunization due    Orders:  •  Zoster Vaccine Recombinant IM           History of Present Illness     Hollie is a 54-year-old female with past medical history of anxiety, hypertension, MDD who presents today for follow-up regarding chronic conditions.    Patient notes significant improvement in cough symptoms with daily Xyzal.    Patient notes worsening in chronic anxiety symptoms exacerbated by work specific related stressors.  Otherwise she notes that she is feeling well.      Review of Systems   Constitutional:  Negative for chills and fever.   HENT:  Negative for ear pain, postnasal drip, rhinorrhea and sore throat.    Respiratory:  Negative for cough, shortness of breath and wheezing.    Cardiovascular:  Negative for chest pain.   Musculoskeletal:  Negative for myalgias.   Skin:  Negative for rash.   Neurological:  Negative for headaches.   Psychiatric/Behavioral:  The patient is nervous/anxious.      Medical History Reviewed by provider this encounter:     .  Past Medical History   Past Medical History:   Diagnosis Date   • Allergic     Unknown when I went to the allergist.   •  Diabetes mellitus (HCC) 02/2011    PRE DM, DIET CONTROLLED   • Fractures July 11th, 2023    Broke right foot   • GERD (gastroesophageal reflux disease)     Unknown when I started my medicine.   • Hypertension     Unknown when started with high blood pressure   • Obesity     I'm currently doing weight watchers, but...   • Stomach disorder     Acid Reflux   • Varicella      Past Surgical History:   Procedure Laterality Date   • KNEE ARTHROSCOPY W/ ACL RECONSTRUCTION AND EPIPHYSEAL HAMSTRING GRAFT  2013   • KNEE SURGERY  December 17th, 2003    ACL reconstruction     Family History   Problem Relation Age of Onset   • Breast cancer Mother 69        Stage 0, had radiation and was cured.   • Cancer Mother         Breast, brain, liver and lung cancer   • Hypertension Mother    • Cancer Father         Skin cancer   • Diabetes Father    • Stroke Father         Minor stroke   • Hypertension Father    • Arthritis Father         He's 88 years old. Has arteritis in his back.   • Hearing loss Father         He wears hearing aids   • Diabetes Sister         Pre DM   • Cancer Sister         Stage 3 Metastatic Melanoma   • No Known Problems Sister    • No Known Problems Sister    • Lung cancer Maternal Grandmother 64   • Cancer Maternal Grandmother         Lung cancer   • Cancer Maternal Grandfather         Stomach cancer   • Stroke Paternal Grandmother         Minor stroke   • Hypertension Paternal Grandmother    • Lung cancer Paternal Grandfather 65   • Cancer Paternal Grandfather         Liver cancer   • Cancer Maternal Uncle         Prostate cancer   • Prostate cancer Maternal Uncle         He sought treatment and cured or controlled.   • Hypertension Sister       reports that she has never smoked. She has never used smokeless tobacco. She reports current alcohol use. She reports that she does not use drugs.  Current Outpatient Medications on File Prior to Visit   Medication Sig Dispense Refill   • amLODIPine (NORVASC) 10 mg  tablet Take 1 tablet (10 mg total) by mouth daily 90 tablet 3   • Azelastine HCl 137 MCG/SPRAY SOLN SPRAY 2 SPRAYS INTO EACH NOSTRIL 2 TIMES A DAY USE IN EACH NOSTRIL AS DIRECTED 90 mL 2   • benzonatate (TESSALON) 200 MG capsule Take 1 capsule (200 mg total) by mouth 3 (three) times a day as needed for cough 20 capsule 0   • cholecalciferol (VITAMIN D3) 1,000 units tablet Take 1 tablet (1,000 Units total) by mouth daily 90 tablet 3   • famotidine 20 mg/5 mL suspension      • fluticasone (FLONASE) 50 mcg/act nasal spray 1 spray into each nostril daily 9.9 mL 0   • hydrochlorothiazide (HYDRODIURIL) 12.5 mg tablet Take 1 tablet (12.5 mg total) by mouth daily 90 tablet 3   • pantoprazole (PROTONIX) 20 mg tablet Take 1 tablet (20 mg total) by mouth daily 90 tablet 1     No current facility-administered medications on file prior to visit.     Allergies   Allergen Reactions   • Dog Epithelium Hives   • Dust Mite Extract Other (See Comments)   • Mixed Ragweed Eye Swelling and Nasal Congestion      Current Outpatient Medications on File Prior to Visit   Medication Sig Dispense Refill   • amLODIPine (NORVASC) 10 mg tablet Take 1 tablet (10 mg total) by mouth daily 90 tablet 3   • Azelastine HCl 137 MCG/SPRAY SOLN SPRAY 2 SPRAYS INTO EACH NOSTRIL 2 TIMES A DAY USE IN EACH NOSTRIL AS DIRECTED 90 mL 2   • benzonatate (TESSALON) 200 MG capsule Take 1 capsule (200 mg total) by mouth 3 (three) times a day as needed for cough 20 capsule 0   • cholecalciferol (VITAMIN D3) 1,000 units tablet Take 1 tablet (1,000 Units total) by mouth daily 90 tablet 3   • famotidine 20 mg/5 mL suspension      • fluticasone (FLONASE) 50 mcg/act nasal spray 1 spray into each nostril daily 9.9 mL 0   • hydrochlorothiazide (HYDRODIURIL) 12.5 mg tablet Take 1 tablet (12.5 mg total) by mouth daily 90 tablet 3   • pantoprazole (PROTONIX) 20 mg tablet Take 1 tablet (20 mg total) by mouth daily 90 tablet 1     No current facility-administered medications on  "file prior to visit.      Social History     Tobacco Use   • Smoking status: Never   • Smokeless tobacco: Never   Vaping Use   • Vaping status: Never Used   Substance and Sexual Activity   • Alcohol use: Yes     Comment: Maybe drink once a month   • Drug use: No   • Sexual activity: Yes     Partners: Male     Birth control/protection: Post-menopausal, None     Comment: I haven't had a period since 4/18/2022        Objective   /78   Pulse 100   Temp 98.5 °F (36.9 °C)   Ht 5' 1\" (1.549 m)   Wt 102 kg (224 lb)   SpO2 93%   BMI 42.32 kg/m²      Physical Exam  Vitals reviewed.   Constitutional:       General: She is not in acute distress.     Appearance: She is well-developed.   HENT:      Head: Normocephalic and atraumatic.      Right Ear: External ear normal.      Left Ear: External ear normal.   Eyes:      Conjunctiva/sclera: Conjunctivae normal.   Cardiovascular:      Rate and Rhythm: Normal rate and regular rhythm.      Heart sounds: No murmur heard.  Pulmonary:      Effort: Pulmonary effort is normal. No respiratory distress.      Breath sounds: Normal breath sounds.   Abdominal:      General: Bowel sounds are normal.      Palpations: Abdomen is soft.      Tenderness: There is no abdominal tenderness.   Musculoskeletal:      Right lower leg: No edema.      Left lower leg: No edema.   Skin:     General: Skin is warm and dry.   Neurological:      General: No focal deficit present.      Mental Status: She is alert and oriented to person, place, and time.   Psychiatric:         Mood and Affect: Mood normal.         Behavior: Behavior normal.         "

## 2024-12-06 NOTE — ASSESSMENT & PLAN NOTE
RAIZA-7 is completed today's visit with score of 9 indicating mild to moderate anxiety.  Given work related stressors recommend trial of increased dosage on Prozac to 30 mg daily.  Recommend follow-up in 6 months or sooner as needed should symptoms persist or worsen.    Orders:  •  FLUoxetine (PROzac) 20 mg capsule; Take 1 capsule (20 mg total) by mouth daily  •  FLUoxetine (PROzac) 10 mg capsule; Take 1 capsule (10 mg total) by mouth daily

## 2024-12-09 ENCOUNTER — TELEPHONE (OUTPATIENT)
Dept: BARIATRICS | Facility: CLINIC | Age: 54
End: 2024-12-09

## 2024-12-11 ENCOUNTER — OFFICE VISIT (OUTPATIENT)
Age: 54
End: 2024-12-11

## 2024-12-11 VITALS
HEIGHT: 61 IN | DIASTOLIC BLOOD PRESSURE: 80 MMHG | TEMPERATURE: 99 F | HEART RATE: 101 BPM | WEIGHT: 220.8 LBS | BODY MASS INDEX: 41.69 KG/M2 | SYSTOLIC BLOOD PRESSURE: 120 MMHG

## 2024-12-11 DIAGNOSIS — E03.8 SUBCLINICAL HYPOTHYROIDISM: ICD-10-CM

## 2024-12-11 DIAGNOSIS — E66.01 OBESITY, CLASS III, BMI 40-49.9 (MORBID OBESITY) (HCC): Primary | ICD-10-CM

## 2024-12-11 DIAGNOSIS — K21.9 ESOPHAGEAL REFLUX: ICD-10-CM

## 2024-12-11 DIAGNOSIS — R73.03 PREDIABETES: ICD-10-CM

## 2024-12-11 RX ORDER — TIRZEPATIDE 2.5 MG/.5ML
2.5 INJECTION, SOLUTION SUBCUTANEOUS WEEKLY
Qty: 2 ML | Refills: 0 | Status: SHIPPED | OUTPATIENT
Start: 2024-12-11 | End: 2025-01-08

## 2024-12-11 NOTE — PATIENT INSTRUCTIONS
I have sent Zebound to your pharmacy. The prior authorization process will been done through our prior authorization team and can take up to 3-4 weeks to process through the insurance.     - Start Zepbound 2.5 mg subcutaneously weekly. After you have taken the second pen, please give me an update, as we will likely increase the dose the next month if you are tolerating it well.    - Side effects of Zepbound include nausea, vomiting, diarrhea, or constipation. Keep an eye on your heart rate while on Zepbound. If you resting heart rate is greater than 100 beats per minutes, please notify me. If you develop severe abdominal pain, stop Zepbound and go to the emergency room, as that could be a sign of pancreatitis.     - Please notify me if you have surgery, upper endoscopy, or colonoscopy scheduled, as we typically hold Zepbound for one week prior to the procedure.     - Zepbound can reduce the effectiveness of oral hormonal birth control (birth control pills). Recommend a barrier backup method such as condoms to prevent pregnancy.

## 2024-12-11 NOTE — PROGRESS NOTES
Assessment/Plan:  Bette was seen today for consult.    Diagnoses and all orders for this visit:    Obesity, Class III, BMI 40-49.9 (morbid obesity) (HCC)  -     tirzepatide (Zepbound) 2.5 mg/0.5 mL auto-injector; Inject 0.5 mL (2.5 mg total) under the skin once a week for 28 days    Esophageal reflux    Subclinical hypothyroidism    Prediabetes         - Discussed options of HealthyCORE-Intensive Lifestyle Intervention Program, Very Low Calorie Diet-VLCD, and Conservative Program and the role of weight loss medications.  - Explained the importance of making lifestyle changes first before starting anti-obesity medications.  - Patient should demonstrate lifestyle changes first before anti-obesity medication initiated.   - Patient is interested in pursuing Conservative Program  - Initial weight loss goal of 5-10% weight loss for improved health as studies have shown this is where we see the greatest impact on improving health and decreasing risk of obesity related conditions.  - Weight loss can improve patient's co-morbid conditions and/or prevent weight-related complications.  - Stop Bang 3/8  - Labs reviewed: As below.      General Recommendations:  Nutrition:  Eat breakfast daily.  Do not skip meals.     Food log (ie.) www.[employer].com, sparkpeople.com, loseit.com, calorieking.com, etc.    Practice mindful eating.  Be sure to set aside time to eat, eat slowly, and savor your food.    Hydration:    At least 64oz of water daily.  No sugar sweetened beverages.  No juice (eat the fruit instead).    Exercise:  Studies have shown that the ideal exercise goal is somewhere between 150 to 300 minutes of moderate intensity exercise a week.  Start with exercising 10 minutes every other day and gradually increase physical activity with a goal of at least 150 minutes of moderate intensity exercise a week, divided over at least 3 days a week.  An example of this would be exercising 30 minutes a day, 5 days a  week.  Resistance training can increase muscle mass and increase our resting metabolic rate.   FULL BODY resistance training is recommended 2-3 times a week.  Do not do this on consecutive days to allow for muscle recovery.    Aim for a bare minimum 5000 steps, even on days you do not exercise.    Monitoring:   Weigh yourself daily.  If this causes undue stress, then just weigh yourself once a week.  Weigh yourself the same time of the day with the same amount of clothing on.  Preferably this should be done after waking up, before you eat, and with no clothing or minimal clothing on.    Calorie goal:  9629-7740 sanam/day (Provided with meal plan to follow).    Return visit:    Calorie tracking/deficit  Physical activity goals  AOM   Contraception: menopause  With patient's overall goal weight of 140lbs, we discussed GLP1 RA and possible bariatric surgery. Patient is not interested in bariatric surgery at this time  - Patient denies personal history of pancreatitis. Patient also denies personal and family history of thyroid cancer and multiple endocrine neoplasia type 2 (MEN 2 tumor).   Trial of zepbound. Use and side effect profile reviewed  RTC: 4 months      Total time spent reviewing chart, interviewing patient, examining patient, discussing plan, answering all questions, and documentin min.       ______________________________________________________________________        Subjective:   Chief Complaint   Patient presents with    Consult     Mwm  consult       HPI: Bettetanya Mckeon  is a 54 y.o. female with history of HTN, hypothyroidism, depression, prediabetes, and excess weight, here to pursue weight loss management.  Previous notes and records have been reviewed.    Hemoglobin A1c 5.6 (24)  TSH WNL    HTN - amlodipine, HCTZ  Depression - prozac  GERD - pantoprazole, famotidine    Weight has been an issue over the past 15 years.  Patient went into menopause 2 years.    Diets - weight watchers (lost some  "weight but regained thereafter)    HPI  Wt Readings from Last 20 Encounters:   12/11/24 100 kg (220 lb 12.8 oz)   12/06/24 102 kg (224 lb)   11/29/24 99.9 kg (220 lb 3.2 oz)   09/06/24 99.8 kg (220 lb)   08/15/24 99.1 kg (218 lb 6.4 oz)   06/06/24 98.7 kg (217 lb 9.6 oz)   04/04/24 96.1 kg (211 lb 12.8 oz)   02/22/24 93.9 kg (207 lb)   02/01/24 90.4 kg (199 lb 4.7 oz)   12/07/23 90.4 kg (199 lb 6.4 oz)   11/15/23 93.7 kg (206 lb 9.6 oz)   11/14/23 90.3 kg (199 lb)   09/26/23 90.7 kg (200 lb)   08/22/23 90.7 kg (200 lb)   08/08/23 90.7 kg (200 lb)   07/19/21 91.6 kg (202 lb)   10/09/20 91.6 kg (202 lb)   06/18/20 91.8 kg (202 lb 6.4 oz)   12/09/19 93 kg (205 lb)   12/06/19 92.5 kg (204 lb)       Food logging:  B: coffee 2 cups + eggwhite omelette, with parmesan cheese   S: skip  L: leftover, pasta/mac n cheese  S: skip  D: protein (chicken, post roast)  + veggie (not much, carrots, peppers, green beans) + starch (potatoes) . Spaghetti, ordering out, steak + baked potato  S: chips or cheetos        Dining out: 3 nights/week   Hydration: 2 cups coffee + silk cream    Water - 4 large glasses    Soda - diet coke or regular coke \     Occasional OJ  Alcohol: \"Rare\" 0-1 drink/month     Exercise: Walking - broke foot last year so somewhat limited.          Past Medical History:   Diagnosis Date    Allergic     Unknown when I went to the allergist.    Diabetes mellitus (HCC) 02/2011    PRE DM, DIET CONTROLLED    Fractures July 11th, 2023    Broke right foot    GERD (gastroesophageal reflux disease)     Unknown when I started my medicine.    Hypertension     Unknown when started with high blood pressure    Obesity     I'm currently doing weight watchers, but...    Stomach disorder     Acid Reflux    Varicella      Patient denies personal and family history of  pancreatitis, thyroid cancer, MEN-2 tumors.  Denies any hx of glaucoma, seizures, kidney stones, gallstones.  Denies Hx of CAD, PAD, palpitations, arrhythmia.   Denies " "uncontrolled anxiety or depression, suicidal behavior or thinking , insomnia or sleep disturbance.   Past Surgical History:   Procedure Laterality Date    KNEE ARTHROSCOPY W/ ACL RECONSTRUCTION AND EPIPHYSEAL HAMSTRING GRAFT  2013    KNEE SURGERY  December 17th, 2003    ACL reconstruction     The following portions of the patient's history were reviewed and updated as appropriate: allergies, current medications, past family history, past medical history, past social history, past surgical history, and problem list.    Review Of Systems:  Review of Systems   Constitutional:  Negative for fatigue and fever.   HENT:  Negative for sore throat, trouble swallowing and voice change.    Respiratory:  Negative for shortness of breath.    Cardiovascular:  Negative for chest pain.   Gastrointestinal:  Negative for abdominal pain, constipation, diarrhea, nausea and vomiting.   Endocrine: Negative for cold intolerance and heat intolerance.   Genitourinary:  Negative for difficulty urinating.   Musculoskeletal:  Negative for arthralgias and back pain.   Psychiatric/Behavioral:  Negative for suicidal ideas. The patient is not nervous/anxious.    All other systems reviewed and are negative.      Objective:  /80 (Patient Position: Sitting, Cuff Size: Standard)   Pulse 101   Temp 99 °F (37.2 °C) (Tympanic)   Ht 5' 1\" (1.549 m)   Wt 100 kg (220 lb 12.8 oz)   BMI 41.72 kg/m²   Physical Exam  Vitals and nursing note reviewed.   Constitutional:       General: She is not in acute distress.     Appearance: Normal appearance. She is obese. She is not ill-appearing, toxic-appearing or diaphoretic.   HENT:      Head: Normocephalic and atraumatic.   Eyes:      General:         Right eye: No discharge.         Left eye: No discharge.      Conjunctiva/sclera: Conjunctivae normal.   Pulmonary:      Effort: Pulmonary effort is normal. No respiratory distress.   Musculoskeletal:         General: Normal range of motion.      Cervical " back: Normal range of motion. No rigidity.      Right lower leg: No edema.      Left lower leg: No edema.   Skin:     Coloration: Skin is not pale.      Findings: No erythema or rash.   Neurological:      General: No focal deficit present.      Mental Status: She is alert.   Psychiatric:         Mood and Affect: Mood normal.         Labs and Imaging  Recent labs and imaging have been personally reviewed.  Lab Results   Component Value Date    WBC 6.24 09/07/2024    HGB 13.6 09/07/2024    HCT 42.2 09/07/2024    MCV 84 09/07/2024     09/07/2024     Lab Results   Component Value Date    SODIUM 141 03/02/2024    K 4.3 03/02/2024     03/02/2024    CO2 31 03/02/2024    AGAP 9 03/02/2024    BUN 16 03/02/2024    CREATININE 0.69 03/02/2024    GLUF 91 03/02/2024    CALCIUM 9.4 03/02/2024    AST 27 03/02/2024    ALT 37 03/02/2024    ALKPHOS 76 03/02/2024    TP 7.3 03/02/2024    TBILI 0.65 03/02/2024    EGFR 99 03/02/2024     Lab Results   Component Value Date    HGBA1C 5.6 09/07/2024     Lab Results   Component Value Date    MTW9MODDJPVC 3.057 09/07/2024     Lab Results   Component Value Date    CHOLESTEROL 181 09/07/2024     Lab Results   Component Value Date    HDL 43 (L) 09/07/2024     Lab Results   Component Value Date    TRIG 147 09/07/2024     Lab Results   Component Value Date    LDLCALC 109 (H) 09/07/2024

## 2024-12-17 ENCOUNTER — TELEPHONE (OUTPATIENT)
Age: 54
End: 2024-12-17

## 2024-12-17 NOTE — TELEPHONE ENCOUNTER
Please let patient know that the GLP1 RA injections are a plan exclusion.    Alternative options would be phentermine or contrave.    Phentermine: increased heart rate, increased blood pressure, palpitations, headache, dizziness, insomnia, altered mood, abdominal upset, and dry mouth. Notify the provider with change in mood. Please go to the emergency room if you develop thoughts of harming yourself or others. Phentermine should be stopped 2 weeks prior to surgery. Notify the provider with any changes in vision.     After starting or increasing dose of phentermine it is advised that you check your resting blood pressure and pulse at least 3 times per week for a month.  Vary the time of day and record these results.  If you have any resting blood pressures above 140/90 or heart rate above 100 beats per minute, then you must notify your weight loss specialist and primary care doctor.      Contrave:  If you develop abdominal upset, headache, dizziness, trouble sleeping, increased blood pressure, depression, anxiety, and fatigue, then you must contact the office right away.  If you develop thoughts of harming yourself or others then stop the medication right away and go to the Emergency Room.

## 2024-12-17 NOTE — TELEPHONE ENCOUNTER
Patient was informed of the providers message. Patient stated that she will think about and call the office back.

## 2024-12-17 NOTE — TELEPHONE ENCOUNTER
PA for Zepbound 2.5mg EXCLUDED from plan       Reason:    Message sent to office clinical pool No

## 2024-12-19 ENCOUNTER — ANNUAL EXAM (OUTPATIENT)
Dept: OBGYN CLINIC | Facility: CLINIC | Age: 54
End: 2024-12-19
Payer: COMMERCIAL

## 2024-12-19 VITALS
HEIGHT: 61 IN | WEIGHT: 222 LBS | SYSTOLIC BLOOD PRESSURE: 118 MMHG | DIASTOLIC BLOOD PRESSURE: 74 MMHG | BODY MASS INDEX: 41.91 KG/M2

## 2024-12-19 DIAGNOSIS — Z12.11 COLON CANCER SCREENING: ICD-10-CM

## 2024-12-19 DIAGNOSIS — Z12.31 ENCOUNTER FOR SCREENING MAMMOGRAM FOR BREAST CANCER: ICD-10-CM

## 2024-12-19 DIAGNOSIS — Z80.0 FAMILY HISTORY OF GI TRACT CANCER: ICD-10-CM

## 2024-12-19 DIAGNOSIS — Z01.419 ENCOUNTER FOR GYNECOLOGICAL EXAMINATION WITHOUT ABNORMAL FINDING: Primary | ICD-10-CM

## 2024-12-19 DIAGNOSIS — Z80.3 FAMILY HISTORY OF BREAST CANCER IN MOTHER: ICD-10-CM

## 2024-12-19 PROCEDURE — S0612 ANNUAL GYNECOLOGICAL EXAMINA: HCPCS | Performed by: NURSE PRACTITIONER

## 2024-12-19 PROCEDURE — 99459 PELVIC EXAMINATION: CPT | Performed by: NURSE PRACTITIONER

## 2024-12-19 NOTE — PROGRESS NOTES
Assessment / Plan    Assessment & Plan  Encounter for gynecological examination without abnormal finding  Well woman exam.  11/2023 pap/hpv negative.  Repeat 2026.  Due for colonoscopy-- referral placed.       Encounter for screening mammogram for breast cancer  Order placed for update    Orders:    Mammo screening bilateral w 3d and cad; Future    Colon cancer screening    Orders:    Ambulatory referral to Gastroenterology; Future    Family history of GI tract cancer    Orders:    Ambulatory Referral to Oncology Genetics; Future    Family history of breast cancer in mother    Orders:    Ambulatory Referral to Oncology Genetics; Future        Subjective      Bette Mckeon is a 54 y.o. female who presents for her annual gynecologic exam.    55 yo G0 PMF    Last pap: 11/2023 pap/hpv negative  Pap Hx: normal  STD hx: none  Sexually active: yes, 20 yr relationship  Last mammogram: 2/2024, negative  Colonoscopy, 8/2019; 5 yr update  Nutrition: Body mass index is 41.95 kg/m².; given guidelines  Calcium intake: given guidelines  Exercise: given guidelines  Safety: feels safe    Periods are absent  Current contraception: post menopausal status  History of abnormal Pap smear: no  Family history of breast,uterine, ovarian or colon cancer: yes - see history; referral for genetic counseling placed for patient.    The following portions of the patient's history were reviewed and updated as appropriate: allergies, current medications, past family history, past medical history, past social history, past surgical history, and problem list.    Review of Systems      Review of Systems   Constitutional:  Negative for chills and fever.   Respiratory:  Negative for cough and shortness of breath.    Gastrointestinal:  Negative for abdominal distention, abdominal pain, blood in stool, constipation, diarrhea, nausea and vomiting.   Genitourinary:  Negative for difficulty urinating, dysuria, frequency, genital sores, hematuria, menstrual  "problem, pelvic pain, urgency, vaginal bleeding and vaginal discharge.   Musculoskeletal:  Negative for arthralgias and myalgias.     Breasts:  Negative for skin changes, dimpling, asymmetry, nipple discharge, redness, tenderness or palpable masses      Objective     *patient agrees to exam without a chaperone present.       /74 (BP Location: Right arm, Patient Position: Sitting, Cuff Size: Large)   Ht 5' 1\" (1.549 m)   Wt 101 kg (222 lb)   BMI 41.95 kg/m²   Physical Exam  Constitutional:       General: She is not in acute distress.     Appearance: Normal appearance. She is well-developed. She is not ill-appearing or diaphoretic.      Comments: Body mass index is 41.95 kg/m².     HENT:      Head: Normocephalic and atraumatic.   Eyes:      Pupils: Pupils are equal, round, and reactive to light.   Neck:      Thyroid: No thyromegaly.   Pulmonary:      Effort: Pulmonary effort is normal.   Chest:   Breasts:     Breasts are symmetrical.      Right: No inverted nipple, mass, nipple discharge, skin change or tenderness.      Left: No inverted nipple, mass, nipple discharge, skin change or tenderness.   Abdominal:      General: There is no distension.      Palpations: Abdomen is soft. There is no mass.      Tenderness: There is no abdominal tenderness. There is no guarding or rebound.   Genitourinary:     General: Normal vulva.      Exam position: Lithotomy position.      Labia:         Right: No rash, tenderness, lesion or injury.         Left: No rash, tenderness, lesion or injury.       Vagina: No signs of injury and foreign body. No vaginal discharge, erythema, tenderness or bleeding.      Cervix: No cervical motion tenderness, discharge or friability.      Uterus: Not enlarged and not tender.       Adnexa:         Right: No mass or tenderness.          Left: No mass or tenderness.        Rectum: Normal.      Comments:   Exam limited by habitus    Musculoskeletal:      Cervical back: Neck supple. "   Lymphadenopathy:      Cervical: No cervical adenopathy.      Upper Body:      Right upper body: No supraclavicular adenopathy.      Left upper body: No supraclavicular adenopathy.   Skin:     General: Skin is warm and dry.   Neurological:      General: No focal deficit present.      Mental Status: She is alert and oriented to person, place, and time.   Psychiatric:         Mood and Affect: Mood normal.         Behavior: Behavior normal.         Thought Content: Thought content normal.         Judgment: Judgment normal.

## 2024-12-19 NOTE — PATIENT INSTRUCTIONS
"Patient Education     Diet and health   The Basics   Written by the doctors and editors at [facility]   Why is it important to eat a healthy diet? -- It's important to eat a healthy diet because eating the right foods can keep you healthy now and later in life. It can lower the risk of problems like heart disease, diabetes, high blood pressure, and some types of cancer. It can also help you live longer and improve your quality of life.  What kind of diet is best? -- There is no 1 specific diet that experts recommend for everyone. People choose what foods to eat for many different reasons. These include personal preference, culture, Yarsani, allergies or intolerances, and nutritional goals. People also need to consider the cost and availability of different foods.  In general, experts recommend a diet that:   Includes lots of vegetables, fruits, beans, nuts, and whole grains   Limits red and processed meats, unhealthy fats, sugar, salt, and alcohol  What are dietary patterns? -- A dietary \"pattern\" means generally eating certain types of foods while limiting others. Some people need to follow a specific dietary pattern because of their health needs. For example, if you have high blood pressure, your doctor might recommend a diet low in salt.  If you are trying to improve your health in general, choosing a healthy dietary pattern can help. This does not have to mean being very strict about what you eat or avoid. The goal is to think about getting plenty of healthy foods while limiting less healthy foods.  Examples of dietary patterns include:   Mediterranean diet - This involves eating a lot of fruits, vegetables, nuts, and whole grains, and uses olive oil instead of other fats. It also includes some fish, poultry, and dairy products, but not a lot of red meat. Following this diet can help your overall health, and might even lower your risk of having a stroke.   Plant-based diets - These patterns focus on vegetables, " "fruits, grains, beans, and nuts. They limit or avoid food that comes from animals, such as meat and dairy. There are different types of plant-based diets, including vegetarian and vegan.   Low-fat diet - A low-fat diet involves limiting calories from fat. This might help some people keep weight off if that is their goal, but it does not have many other health benefits. If you choose to follow a low-fat diet, it is also important to focus on getting lots of whole grains, legumes, fruits, and vegetables. Limit refined grains and sugar.   Low-cholesterol diet - Cholesterol is found in foods with a lot of saturated fat, like red meat, butter, and cheese. A low-cholesterol diet focuses on limiting the amount of cholesterol that you eat. Limiting the cholesterol in your diet can also help lower the amount of unhealthy fats that you eat.  Which foods are especially healthy? -- Foods that are especially healthy include:   Fruits and vegetables - Eating a diet with lots of fruits and vegetables can help prevent heart disease and stroke. It might also help prevent certain types of cancer. Try to eat fruits and vegetables at each meal and also for snacks. If you don't have fresh fruits and vegetables available, you can eat frozen or canned ones instead. Doctors recommend eating at least 5 servings of fruits or vegetables each day.   Whole grains - Whole-grain foods include 100 percent whole-wheat bread, steel cut oats, and whole-grain pasta. These are healthier than foods made with \"refined\" grains, like white bread and white rice. Eating lots of whole grains instead of refined grains has been shown to help with weight control. It can also lower the risk of several health problems, including colon cancer, heart disease, and diabetes. Doctors recommend that most people try to eat 5 to 8 servings of whole-grain, high-fiber foods each day.   Foods with fiber - Eating foods with a lot of fiber can help prevent heart disease and " "stroke. If you have type 2 diabetes, it can also help control your blood sugar. Foods that have a lot of fiber include vegetables, fruits, beans, nuts, oatmeal, and whole-grain breads and cereals. You can tell how much fiber is in a food by reading the nutrition label (figure 1). Doctors recommend that most people eat about 25 to 34 grams of fiber each day.   Foods with calcium and vitamin D - Babies, children, and adults need calcium and vitamin D to help keep their bones strong. Adults also need calcium and vitamin D to help prevent osteoporosis. Osteoporosis is a condition that causes bones to get \"thin\" and break more easily than usual. Different foods and drinks have calcium and vitamin D in them (figure 2). People who don't get enough calcium and vitamin D in their diet might need to take a supplement. Doctors recommend that most people have 2 to 3 servings of foods with calcium and vitamin D each day.   Foods with protein - Protein helps your muscles and bones stay strong. Healthy foods with a lot of protein include chicken, fish, eggs, beans, nuts, and soy products. Red meat also has a lot of protein, but it also contains fats, which can be unhealthy. Doctors recommend that most people try to eat about 5 servings of protein each day.   Healthy fats - There are different types of fats. Some types of fats are better for your body than others. Healthy fats are \"monounsaturated\" or \"polyunsaturated\" fats. These are found in fatty fish, nuts and nut butters, and avocados. Use plant-based oils when cooking. Examples of these oils include olive, canola, safflower, sunflower, and corn oil. Eating foods with healthy fats, while avoiding or limiting foods with unhealthy fats, might lower the risk of heart disease.   Foods with folate - Folate is a vitamin that is important for pregnant people, since it helps prevent certain birth defects. It is also called \"folic acid.\" Anyone who could get pregnant should get at " "least 400 micrograms of folic acid daily, whether or not they are actively trying to get pregnant. Folate is found in many breakfast cereals, oranges, orange juice, and green leafy vegetables.  What foods should I avoid or limit? -- To eat a healthy diet, there are some things that you should avoid or limit. They include:   Unhealthy fats - \"Trans\" fats are especially unhealthy. They are found in margarines, many fast foods, and some store-bought baked goods. \"Saturated\" fats are found in animal products like meats, egg yolks, butter, cheese, and full-fat milk products. Unhealthy fats can raise your cholesterol level and increase your chance of getting heart disease.   Sugar - To have a healthy diet, it's important to limit or avoid added sugar, sweets, and refined grains. Refined grains are found in white bread, white rice, most pastas, and most packaged \"snack\" foods.  Avoiding sugar-sweetened beverages, like soda and sports drinks, can also help improve your health.  Avoid canned fruits in \"heavy\" syrup.   Red and processed meats - Studies have shown that eating a lot of red meat can increase your risk of certain health problems, including heart disease and cancer. You should limit the amount of red meat that you eat. This is also true for processed meats like sausage, hot dogs, and harrell.  Can I drink alcohol as part of a healthy diet? -- Not drinking alcohol at all is the healthiest choice. Regular drinking can raise a person's chances of getting liver disease and certain types of cancers. In females, even 1 drink a day can increase the risk of getting breast cancer.  If you do choose to drink, most doctors recommend limiting alcohol to no more than:   1 drink a day for females   2 drinks a day for males  The limits are different because, generally, the female body takes longer to break down alcohol.  How many calories do I need each day? -- Calories give your body energy. The number of calories that you need " "each day depends on your weight, height, age, sex, and how active you are.  Your doctor or nurse can tell you about how many calories you should eat each day. You can also work with a dietitian (nutrition expert) to learn more about your dietary needs and options.  What if I am having trouble improving my diet? -- It can be hard to change the way that you eat. Remember that even small changes can improve your health.  Here are some tips that might help:   Try to make fruits and vegetables part of every meal. If you don't have fresh fruits and vegetables, frozen or canned are good options. Look for products without added salt or sugar.   Keep a bowl of fruit out for snacking.   When you can, choose whole grains instead of refined grains. Choose chicken, fish, and beans instead of red meat and cheese.   Try to eat prepared and processed foods less often.   Try flavored seltzer or water instead of soda or juice.   When eating at fast food restaurants, look for healthier items, like broiled chicken or salad.  If you have questions about which foods you should or should not eat, ask your doctor, nurse, or dietitian. The right diet for you will depend, in part, on your health and any medical conditions you have.  All topics are updated as new evidence becomes available and our peer review process is complete.  This topic retrieved from [employer] on: Feb 28, 2024.  Topic 32767 Version 28.0  Release: 32.2.4 - C32.58  © 2024 UpToDate, Inc. and/or its affiliates. All rights reserved.  figure 1: Nutrition label - Fiber     This is an example of a nutrition label. To figure out how much fiber is in a food, look for the line that says \"Dietary Fiber.\" It's also important to look at the serving size. This food has 7 grams of fiber in each serving, and each serving is 1 cup.  Graphic 39819 Version 8.0  figure 2: Foods and drinks with calcium and vitamin D     Foods rich in calcium include ice cream, soy milk, breads, kale, " "broccoli, milk, cheese, cottage cheese, almonds, yogurt, ready-to-eat cereals, beans, and tofu. Foods rich in vitamin D include milk, fortified plant-based \"milks\" (soy, almond), canned tuna fish, cod liver oil, yogurt, ready-to-eat-cereals, cooked salmon, canned sardines, mackerel, and eggs. Some of these foods are rich in both.  Graphic 69060 Version 4.0  Consumer Information Use and Disclaimer   Disclaimer: This generalized information is a limited summary of diagnosis, treatment, and/or medication information. It is not meant to be comprehensive and should be used as a tool to help the user understand and/or assess potential diagnostic and treatment options. It does NOT include all information about conditions, treatments, medications, side effects, or risks that may apply to a specific patient. It is not intended to be medical advice or a substitute for the medical advice, diagnosis, or treatment of a health care provider based on the health care provider's examination and assessment of a patient's specific and unique circumstances. Patients must speak with a health care provider for complete information about their health, medical questions, and treatment options, including any risks or benefits regarding use of medications. This information does not endorse any treatments or medications as safe, effective, or approved for treating a specific patient. UpToDate, Inc. and its affiliates disclaim any warranty or liability relating to this information or the use thereof.The use of this information is governed by the Terms of Use, available at https://www.wolterskluwer.com/en/know/clinical-effectiveness-terms. 2024© UpToDate, Inc. and its affiliates and/or licensors. All rights reserved.  Copyright   © 2024 UpToDate, Inc. and/or its affiliates. All rights reserved.  Patient Education     Calcium Rich Diet   About this topic   Calcium is one of the most important minerals and is found in almost all parts of your " body. It makes your teeth and bones strong and healthy. Your body does not make calcium. It is important for you to eat calcium rich foods to get enough calcium. It also helps your body:  Make blood clots  Keep your heartbeat normal  Helps blood move throughout the body  Release hormones  Release enzymes  Send and get signals between your nerves and brain  Make muscles work well         What will the results be?   It is important to have a good amount of calcium in your food. Calcium helps your body work well. It is very important during every life stage. Calcium may also keep you from losing bone mass. This is osteopenia. It may help keep you from having weak bones. This is osteoporosis.  What drugs may be needed?   The doctor may order calcium and vitamin D supplements. You need vitamin D to help your body take in the calcium. Your calcium supplement may have vitamin D in it.  Talk to your doctor about:  If it is OK to take your calcium with food.  How often to take your calcium supplement throughout the day.  All the drugs you are taking. Be sure to include all prescription and over-the-counter (OTC) drugs, and herbal supplements. Tell the doctor about any drug allergy. Bring a list of drugs you take with you. Some drugs may cause problems with how your body takes in calcium.  Will physical activity be limited?   No, physical activities will not be limited. It is good for you to do light exercises and to stay active.  What changes to diet are needed?   You will need to watch how much calcium is in the foods you eat. Your doctor will talk to you about the right amount of calcium for you. How much calcium you need is based on your age and life stage.  When is this diet used?   This diet is used when your normal diet is low in calcium. It is needed to raise the amount of calcium in your diet. Our bodies do not take in calcium well as we get older.  Who should not use this diet?   People with too much calcium in  their blood should not use this diet. This is called hypercalcemia.  What foods are good to eat?   Milk, yogurt, and cheese products are naturally high in calcium.  These foods have smaller amounts of calcium. If they are eaten often and in large amounts they can be good sources of calcium:  Oysters; dried fruit like figs and raisins; green leafy vegetables like broccoli, collards, kale, mustard greens, bok félix; soy beans; oranges  Sunol and sardines. Be sure to eat the soft bones.  Nuts like almonds and [place] nuts, sunflower seeds, tahini, dried beans  Food products with calcium added to them like orange juice, tofu, cereal, bread; almond milk, rice milk, soy milk  What foods should be limited or avoided?   People who eat many kinds of foods do not have to be worried about limiting or avoiding certain foods.   What problems could happen?   Some people may get kidney stones. This may happen after taking high amounts of calcium over a long time. Calcium from food does not seem to cause kidney stones.  Hard stools.  Too much calcium may interfere with your body’s ability to absorb iron and zinc.  When do I need to call the doctor?   Call your doctor if you have concerns about your diet. Tell your doctor if you are allergic to any of the foods in your diet.  Helpful tips   If you have problems digesting milk, try lactose-free milk. You may also use a product to help you take in lactose.  Talk with your doctor if you are a vegetarian and do not eat dairy products. Your doctor can help you make sure you get the amount of calcium your body needs.  Last Reviewed Date   2021-03-12  Consumer Information Use and Disclaimer   This generalized information is a limited summary of diagnosis, treatment, and/or medication information. It is not meant to be comprehensive and should be used as a tool to help the user understand and/or assess potential diagnostic and treatment options. It does NOT include all information about  conditions, treatments, medications, side effects, or risks that may apply to a specific patient. It is not intended to be medical advice or a substitute for the medical advice, diagnosis, or treatment of a health care provider based on the health care provider's examination and assessment of a patient’s specific and unique circumstances. Patients must speak with a health care provider for complete information about their health, medical questions, and treatment options, including any risks or benefits regarding use of medications. This information does not endorse any treatments or medications as safe, effective, or approved for treating a specific patient. UpToDate, Inc. and its affiliates disclaim any warranty or liability relating to this information or the use thereof. The use of this information is governed by the Terms of Use, available at https://www.[employer].com/en/know/clinical-effectiveness-terms   Copyright   Copyright © 2024 UpToDate, Inc. and its affiliates and/or licensors. All rights reserved.  Patient Education     Exercise and movement   The Basics   Written by the doctors and editors at [employer]   What are the benefits of movement? -- Moving your body has many benefits. It can:   Burn calories, which helps people manage their weight   Help control blood sugar levels in people with diabetes   Lower blood pressure, especially in people with high blood pressure   Lower stress, and help with depression and anxiety   Keep bones strong, so they don't get thin and break easily   Lower the chance of dying from heart disease  Adding even small amounts of physical activity to your daily routine can improve your health.  What are the main types of exercise? -- There are 3 main types of exercise:   Aerobic exercise - This raises your heart rate. Examples include walking, running, dancing, riding a bike, and swimming.   Muscle strengthening - This helps make your muscles stronger. You can do it using  weights, exercise bands, or weight machines. You can also use your own body weight, as with push-ups, or by lifting items in your home, like jugs of water.   Stretching - These help your muscles and joints move more easily.  It's important to have all 3 types in your exercise program. That way, your body, muscles, and joints can be as healthy as possible.  Should I talk to my doctor or nurse before I start exercising? -- If you have not exercised before or have not exercised in a long time, talk with your doctor or nurse before you start a very active exercise program.  If you have heart disease or risk factors for heart disease (like high blood pressure or diabetes), your doctor or nurse might recommend that you have an exercise test before starting an exercise program.  When you begin an exercise program, start slowly. For example, do the exercise at a slow pace or for a few minutes only. Over time, you can exercise faster and for longer periods of time.  What should I do when I exercise? -- Each time you exercise, you should:   Warm up - This can help keep you from hurting your muscles when you exercise. To warm up, do a light aerobic exercise (such as walking slowly) or stretch for 5 to 10 minutes.   Work out - Try to get a mix of aerobic exercise, muscle strengthening, and stretching. During an aerobic workout, you can walk fast, swim, run, or use an exercise machine, for example. Other activities, like dancing or playing tennis, are also forms of aerobic exercise. You should also take time to stretch all of your joints, including your neck, shoulders, back, hips, and knees. At least 2 times a week, you can do muscle strengthening exercises as part of your workout.   Cool down - This helps keep you from feeling dizzy after you exercise and helps prevent muscle cramps. To cool down, you can stretch or do a light aerobic exercise for 5 minutes.  Some people go to a gym or do group exercise classes. But you can  exercise even without these things. Some exercises can be done even in a small space. You can also try online videos or smartphone apps to get ideas for different types of exercise.  How often should I exercise? -- Doctors recommend that people exercise at least 30 minutes a day, on 5 or more days of the week.  If you can't exercise for 30 minutes straight, try to exercise for 10 minutes at a time, 3 or 4 times a day. Even exercising for shorter amounts of time is good for you, especially if it means spending less time sitting.  When should I call my doctor or nurse? -- If you have any of the following symptoms when you exercise, stop exercising and call your doctor or nurse right away:   Pain or pressure in your chest, arms, throat, jaw, or back   Nausea or vomiting   Feeling like your heart is fluttering or racing very fast   Feeling dizzy or faint  What if I don't have time to exercise? -- Many people have very busy lives and might not think that they have time to exercise. But it's important to try to find time, even if you are tired or work a lot. Exercise can increase your energy level, which can make you feel better and might even help you get more work done.  Even if it's hard to set aside a lot of time to exercise, you can still improve your health by moving your body more. There are many ways that you can be more active. For example, you can:   Take the stairs instead of the elevator.   Park in a parking space that is farther away from the door.   Take a longer route when you walk from one place to another.  Spending a lot of time sitting still (for example, watching TV or working on the computer) is bad for your health. Try to get up and move around whenever you can. Even small amounts of movement, like taking short walks, doing household chores, or gardening, can improve your health. Finding activities that you enjoy, or doing them with other people, can help you add more movement into your daily  life.  What else should I do when I exercise? -- To exercise safely and avoid problems, it's important to:   Drink fluids during and after exercising (but avoid drinks with a lot of caffeine or sugar).   Avoid exercising outside if it is too hot or cold.   Wear layers of clothes, so that you can take them off if you get too hot.   Wear shoes that fit well and support your feet.   Be aware of your surroundings if you exercise outside.  All topics are updated as new evidence becomes available and our peer review process is complete.  This topic retrieved from [employer] on: May 18, 2024.  Topic 91791 Version 31.0  Release: 32.4.3 - C32.137  © 2024 UpToDate, Inc. and/or its affiliates. All rights reserved.  Consumer Information Use and Disclaimer   Disclaimer: This generalized information is a limited summary of diagnosis, treatment, and/or medication information. It is not meant to be comprehensive and should be used as a tool to help the user understand and/or assess potential diagnostic and treatment options. It does NOT include all information about conditions, treatments, medications, side effects, or risks that may apply to a specific patient. It is not intended to be medical advice or a substitute for the medical advice, diagnosis, or treatment of a health care provider based on the health care provider's examination and assessment of a patient's specific and unique circumstances. Patients must speak with a health care provider for complete information about their health, medical questions, and treatment options, including any risks or benefits regarding use of medications. This information does not endorse any treatments or medications as safe, effective, or approved for treating a specific patient. UpToDate, Inc. and its affiliates disclaim any warranty or liability relating to this information or the use thereof.The use of this information is governed by the Terms of Use, available at  https://www.woltersAtomic Reachuwer.com/en/know/clinical-effectiveness-terms. 2024© [employer] and its affiliates and/or licensors. All rights reserved.  Copyright   © 2024 [employer] and/or its affiliates. All rights reserved.

## 2025-02-24 DIAGNOSIS — I10 PRIMARY HYPERTENSION: ICD-10-CM

## 2025-02-25 RX ORDER — HYDROCHLOROTHIAZIDE 12.5 MG/1
12.5 TABLET ORAL DAILY
Qty: 90 TABLET | Refills: 1 | Status: SHIPPED | OUTPATIENT
Start: 2025-02-25

## 2025-03-05 ENCOUNTER — OFFICE VISIT (OUTPATIENT)
Dept: URGENT CARE | Age: 55
End: 2025-03-05
Payer: COMMERCIAL

## 2025-03-05 VITALS
DIASTOLIC BLOOD PRESSURE: 70 MMHG | RESPIRATION RATE: 18 BRPM | SYSTOLIC BLOOD PRESSURE: 120 MMHG | HEART RATE: 77 BPM | TEMPERATURE: 98.2 F | WEIGHT: 212 LBS | HEIGHT: 61 IN | BODY MASS INDEX: 40.02 KG/M2 | OXYGEN SATURATION: 99 %

## 2025-03-05 DIAGNOSIS — H57.89 DISCHARGE OF LEFT EYE: Primary | ICD-10-CM

## 2025-03-05 PROCEDURE — G0382 LEV 3 HOSP TYPE B ED VISIT: HCPCS

## 2025-03-05 PROCEDURE — S9083 URGENT CARE CENTER GLOBAL: HCPCS

## 2025-03-05 RX ORDER — OFLOXACIN 3 MG/ML
1 SOLUTION/ DROPS OPHTHALMIC 4 TIMES DAILY
Qty: 5 ML | Refills: 0 | Status: SHIPPED | OUTPATIENT
Start: 2025-03-05 | End: 2025-03-12

## 2025-03-05 NOTE — PATIENT INSTRUCTIONS
Use antibiotic eye drops as directed.  Discard any used contacts.   Avoid use of contacts until follow-up with ophthalmology.  Wash all linens with hot soap and water.   Recommend warm compresses.   Follow-up with your eye doctor.   Acetaminophen or ibuprofen OTC for pain.  PCP follow-up in 3-5 days.  Proceed to the ER if symptoms worsen.

## 2025-03-05 NOTE — PROGRESS NOTES
[facility]        NAME: Bette Mckeon is a 54 y.o. female  : 1970    MRN: [id number]  DATE: 2025  TIME: 4:10 PM      Assessment and Plan     Discharge of left eye [H57.89]  1. Discharge of left eye  ofloxacin (OCUFLOX) 0.3 % ophthalmic solution            Patient Instructions     Use antibiotic eye drops as directed.  Discard any used contacts.   Avoid use of contacts until follow-up with ophthalmology.  Wash all linens with hot soap and water.   Recommend warm compresses.   Follow-up with your eye doctor.   Acetaminophen or ibuprofen OTC for pain.  PCP follow-up in 3-5 days.  Proceed to the ER if symptoms worsen.     Chief Complaint     Chief Complaint   Patient presents with    Eye Pain     Started with puffiness and swelling of left eye and redness.  Patient has not done anything for this.  Patient states it is not painful.  Patient denies discharge or crusting of left eye. No other complaints.         History of Present Illness     Patient is a 54-year-old female who presents with redness to left inner upper eyelid. Denies known injury or trauma. Denies discharge. Reports she does use contacts.     Eye Pain   Associated symptoms include eye redness. Pertinent negatives include no eye discharge, fever, itching or photophobia.       Review of Systems     Review of Systems   Constitutional:  Negative for fever.   Eyes:  Positive for redness. Negative for photophobia, pain, discharge, itching and visual disturbance.   All other systems reviewed and are negative.        Current Medications       Current Outpatient Medications:     amLODIPine (NORVASC) 10 mg tablet, Take 1 tablet (10 mg total) by mouth daily, Disp: 90 tablet, Rfl: 3    Azelastine HCl 137 MCG/SPRAY SOLN, SPRAY 2 SPRAYS INTO EACH NOSTRIL 2 TIMES A DAY USE IN EACH NOSTRIL AS DIRECTED, Disp: 90 mL, Rfl: 2    cholecalciferol (VITAMIN D3) 1,000 units tablet, Take 1 tablet (1,000 Units total) by mouth daily, Disp: 90 tablet,  Rfl: 3    famotidine 20 mg/5 mL suspension, , Disp: , Rfl:     FLUoxetine (PROzac) 10 mg capsule, Take 1 capsule (10 mg total) by mouth daily, Disp: 90 capsule, Rfl: 1    FLUoxetine (PROzac) 20 mg capsule, Take 1 capsule (20 mg total) by mouth daily, Disp: 90 capsule, Rfl: 1    hydroCHLOROthiazide 12.5 mg tablet, Take 1 tablet by mouth once daily, Disp: 90 tablet, Rfl: 1    ofloxacin (OCUFLOX) 0.3 % ophthalmic solution, Administer 1 drop into the left eye 4 (four) times a day for 7 days, Disp: 5 mL, Rfl: 0    pantoprazole (PROTONIX) 20 mg tablet, Take 1 tablet (20 mg total) by mouth daily, Disp: 90 tablet, Rfl: 1    Current Allergies     Allergies as of 03/05/2025 - Reviewed 03/05/2025   Allergen Reaction Noted    Dog epithelium Hives 04/04/2024    Dust mite extract Other (See Comments) 09/11/2002    Mixed ragweed Eye Swelling and Nasal Congestion 08/08/2023              The following portions of the patient's history were reviewed and updated as appropriate: allergies, current medications, past family history, past medical history, past social history, past surgical history and problem list.     Past Medical History:   Diagnosis Date    Allergic     Unknown when I went to the allergist.    Cataract 12/5/2024    Slight development of cataracts    Diabetes mellitus (HCC) 02/2011    PRE DM, DIET CONTROLLED    Fractures July 11th, 2023    Broke right foot    GERD (gastroesophageal reflux disease)     Unknown when I started my medicine.    Hypertension     Unknown when started with high blood pressure    Obesity     I'm currently doing weight watchers, but...    Stomach disorder     Acid Reflux    Varicella        Past Surgical History:   Procedure Laterality Date    KNEE ARTHROSCOPY W/ ACL RECONSTRUCTION AND EPIPHYSEAL HAMSTRING GRAFT  2013    KNEE SURGERY  December 17th, 2003    ACL reconstruction       Family History   Problem Relation Age of Onset    Breast cancer Mother 69        Stage 0, had radiation and was  "cured.    Cancer Mother         Breast, brain, liver and lung cancer    Hypertension Mother     Cancer Father         Skin cancer    Diabetes Father     Stroke Father         Minor stroke    Hypertension Father     Arthritis Father         He's 88 years old. Has arthritis in his back.    Hearing loss Father         He wears hearing aids    Diabetes Sister         Pre DM    Cancer Sister         Stage 3 Metastatic Melanoma    No Known Problems Sister     No Known Problems Sister     Hypertension Sister     Lung cancer Maternal Grandmother 64    Cancer Maternal Grandmother         Lung cancer    Cancer Maternal Grandfather         Stomach cancer    Stroke Paternal Grandmother         Minor stroke    Hypertension Paternal Grandmother     Lung cancer Paternal Grandfather 65    Cancer Paternal Grandfather         Liver cancer    Cancer Maternal Uncle         Prostate cancer    Prostate cancer Maternal Uncle         He sought treatment and cured or controlled.    Colon cancer Neg Hx     Ovarian cancer Neg Hx     Uterine cancer Neg Hx          Medications have been verified.        Objective     /70 (BP Location: Left arm, Patient Position: Sitting)   Pulse 77   Temp 98.2 °F (36.8 °C) (Tympanic)   Resp 18   Ht 5' 1\" (1.549 m)   Wt 96.2 kg (212 lb)   SpO2 99%   BMI 40.06 kg/m²   No LMP recorded. Patient is postmenopausal.         Physical Exam     Physical Exam  Vitals and nursing note reviewed.   Constitutional:       General: She is awake. She is not in acute distress.     Appearance: Normal appearance. She is not ill-appearing, toxic-appearing or diaphoretic.   Eyes:      General:         Right eye: No discharge.         Left eye: Discharge present.     Conjunctiva/sclera:      Right eye: Right conjunctiva is not injected.      Left eye: Left conjunctiva is not injected.      Pupils: Pupils are equal, round, and reactive to light.      Comments: Swelling and erythema to left upper inner eyelid consistent " with stye. Small amount of purulent discharge noted.    Skin:     General: Skin is warm.      Capillary Refill: Capillary refill takes less than 2 seconds.   Neurological:      Mental Status: She is alert.   Psychiatric:         Mood and Affect: Mood normal.         Behavior: Behavior normal.         Thought Content: Thought content normal.         Judgment: Judgment normal.

## 2025-03-07 ENCOUNTER — PREP FOR PROCEDURE (OUTPATIENT)
Age: 55
End: 2025-03-07

## 2025-03-07 ENCOUNTER — TELEPHONE (OUTPATIENT)
Age: 55
End: 2025-03-07

## 2025-03-07 DIAGNOSIS — Z12.11 SCREENING FOR COLON CANCER: Primary | ICD-10-CM

## 2025-03-07 NOTE — TELEPHONE ENCOUNTER
Scheduled date of colonoscopy (as of today):4/1/2025  Physician performing colonoscopy:Dr Salinas  Location of colonoscopy:[place]  Bowel prep reviewed with patient:Miralax/Dulcolax  Instructions reviewed with patient by:sent via letter  Clearances: N/A

## 2025-03-07 NOTE — TELEPHONE ENCOUNTER
03/07/25  Screened by: Paradise Cutler    Referring Provider     Pre- Screening:     There is no height or weight on file to calculate BMI.212lbs 40.06  Has patient been referred for a routine screening Colonoscopy? yes  Is the patient between 45-75 years old? yes      Previous Colonoscopy yes   If yes:    Date:     Facility:     Reason:    Does the patient want to see a Gastroenterologist prior to their procedure OR are they having any GI symptoms? no    Has the patient been hospitalized or had abdominal surgery in the past 6 months? no    Does the patient use supplemental oxygen? no    Does the patient take Coumadin, Lovenox, Plavix, Elliquis, Xarelto, or other blood thinning medication? no    Has the patient had a stroke, cardiac event, or stent placed in the past year? no        If patient is between 45yrs - 49yrs, please advise patient that we will have to confirm benefits & coverage with their insurance company for a routine screening colonoscopy.

## 2025-03-07 NOTE — LETTER
Attached are your prep instructions for your upcoming procedure on 4/1/2025. If you have any questions or concerns please contact us at [phone].                Thank you,      [facility]

## 2025-03-10 DIAGNOSIS — I10 PRIMARY HYPERTENSION: ICD-10-CM

## 2025-03-10 DIAGNOSIS — E55.9 VITAMIN D INSUFFICIENCY: ICD-10-CM

## 2025-03-11 RX ORDER — AMLODIPINE BESYLATE 10 MG/1
10 TABLET ORAL DAILY
Qty: 90 TABLET | Refills: 0 | Status: SHIPPED | OUTPATIENT
Start: 2025-03-11

## 2025-03-11 RX ORDER — CHOLECALCIFEROL (VITAMIN D3) 25 MCG
1000 TABLET ORAL DAILY
Qty: 90 TABLET | Refills: 1 | Status: SHIPPED | OUTPATIENT
Start: 2025-03-11

## 2025-03-18 ENCOUNTER — ANESTHESIA EVENT (OUTPATIENT)
Dept: ANESTHESIOLOGY | Facility: HOSPITAL | Age: 55
End: 2025-03-18

## 2025-03-18 ENCOUNTER — ANESTHESIA (OUTPATIENT)
Dept: ANESTHESIOLOGY | Facility: HOSPITAL | Age: 55
End: 2025-03-18

## 2025-03-25 ENCOUNTER — HOSPITAL ENCOUNTER (OUTPATIENT)
Dept: MAMMOGRAPHY | Facility: MEDICAL CENTER | Age: 55
Discharge: HOME | End: 2025-03-25
Payer: COMMERCIAL

## 2025-03-25 VITALS — BODY MASS INDEX: 40.04 KG/M2 | HEIGHT: 61 IN | WEIGHT: 212.08 LBS

## 2025-03-25 DIAGNOSIS — Z12.31 ENCOUNTER FOR SCREENING MAMMOGRAM FOR BREAST CANCER: ICD-10-CM

## 2025-03-25 PROCEDURE — 77063 BREAST TOMOSYNTHESIS BI: CPT

## 2025-03-25 PROCEDURE — 77067 SCR MAMMO BI INCL CAD: CPT

## 2025-03-26 ENCOUNTER — PATIENT MESSAGE (OUTPATIENT)
Dept: GASTROENTEROLOGY | Facility: CLINIC | Age: 55
End: 2025-03-26

## 2025-03-28 ENCOUNTER — RESULTS FOLLOW-UP (OUTPATIENT)
Dept: OBGYN CLINIC | Facility: CLINIC | Age: 55
End: 2025-03-28

## 2025-03-31 RX ORDER — ONDANSETRON 2 MG/ML
4 INJECTION INTRAMUSCULAR; INTRAVENOUS ONCE AS NEEDED
Status: CANCELLED | OUTPATIENT
Start: 2025-03-31

## 2025-03-31 RX ORDER — ALBUTEROL SULFATE 0.83 MG/ML
2.5 SOLUTION RESPIRATORY (INHALATION) ONCE AS NEEDED
Status: CANCELLED | OUTPATIENT
Start: 2025-03-31

## 2025-03-31 RX ORDER — SODIUM CHLORIDE, SODIUM LACTATE, POTASSIUM CHLORIDE, CALCIUM CHLORIDE 600; 310; 30; 20 MG/100ML; MG/100ML; MG/100ML; MG/100ML
125 INJECTION, SOLUTION INTRAVENOUS CONTINUOUS
Status: CANCELLED | OUTPATIENT
Start: 2025-03-31

## 2025-04-01 ENCOUNTER — HOSPITAL ENCOUNTER (OUTPATIENT)
Dept: GASTROENTEROLOGY | Facility: MEDICAL CENTER | Age: 55
Setting detail: OUTPATIENT SURGERY
Discharge: HOME | End: 2025-04-01
Payer: COMMERCIAL

## 2025-04-01 ENCOUNTER — ANESTHESIA EVENT (OUTPATIENT)
Dept: GASTROENTEROLOGY | Facility: MEDICAL CENTER | Age: 55
End: 2025-04-01
Payer: COMMERCIAL

## 2025-04-01 ENCOUNTER — ANESTHESIA (OUTPATIENT)
Dept: GASTROENTEROLOGY | Facility: MEDICAL CENTER | Age: 55
End: 2025-04-01
Payer: COMMERCIAL

## 2025-04-01 VITALS
DIASTOLIC BLOOD PRESSURE: 73 MMHG | SYSTOLIC BLOOD PRESSURE: 112 MMHG | BODY MASS INDEX: 39.65 KG/M2 | HEART RATE: 86 BPM | WEIGHT: 210 LBS | RESPIRATION RATE: 18 BRPM | TEMPERATURE: 97.4 F | HEIGHT: 61 IN | OXYGEN SATURATION: 94 %

## 2025-04-01 DIAGNOSIS — Z12.11 SCREENING FOR COLON CANCER: ICD-10-CM

## 2025-04-01 PROCEDURE — 88305 TISSUE EXAM BY PATHOLOGIST: CPT | Performed by: STUDENT IN AN ORGANIZED HEALTH CARE EDUCATION/TRAINING PROGRAM

## 2025-04-01 RX ORDER — SODIUM CHLORIDE, SODIUM LACTATE, POTASSIUM CHLORIDE, CALCIUM CHLORIDE 600; 310; 30; 20 MG/100ML; MG/100ML; MG/100ML; MG/100ML
125 INJECTION, SOLUTION INTRAVENOUS CONTINUOUS
Status: DISCONTINUED | OUTPATIENT
Start: 2025-04-01 | End: 2025-04-05 | Stop reason: HOSPADM

## 2025-04-01 RX ORDER — PROPOFOL 10 MG/ML
INJECTION, EMULSION INTRAVENOUS AS NEEDED
Status: DISCONTINUED | OUTPATIENT
Start: 2025-04-01 | End: 2025-04-01

## 2025-04-01 RX ADMIN — PROPOFOL 40 MG: 10 INJECTION, EMULSION INTRAVENOUS at 09:13

## 2025-04-01 RX ADMIN — PROPOFOL 40 MG: 10 INJECTION, EMULSION INTRAVENOUS at 09:12

## 2025-04-01 RX ADMIN — PROPOFOL 120 MG: 10 INJECTION, EMULSION INTRAVENOUS at 09:11

## 2025-04-01 RX ADMIN — SODIUM CHLORIDE, SODIUM LACTATE, POTASSIUM CHLORIDE, AND CALCIUM CHLORIDE: .6; .31; .03; .02 INJECTION, SOLUTION INTRAVENOUS at 08:59

## 2025-04-01 RX ADMIN — PROPOFOL 100 MG: 10 INJECTION, EMULSION INTRAVENOUS at 09:20

## 2025-04-01 RX ADMIN — PROPOFOL 40 MG: 10 INJECTION, EMULSION INTRAVENOUS at 09:14

## 2025-04-01 NOTE — ANESTHESIA PREPROCEDURE EVALUATION
Procedure:  COLONOSCOPY    Relevant Problems   CARDIO   (+) Hypertension      ENDO   (+) Subclinical hypothyroidism      GI/HEPATIC   (+) Esophageal reflux      NEURO/PSYCH   (+) Anxiety   (+) Depression   (+) Headache      Other   (+) Obesity (BMI 30-39.9)        Physical Exam    Airway    Mallampati score: II         Dental       Cardiovascular      Pulmonary      Other Findings  post-pubertal.      Anesthesia Plan  ASA Score- 3     Anesthesia Type- IV sedation with anesthesia with ASA Monitors.         Additional Monitors:     Airway Plan:            Plan Factors-    Chart reviewed.                      Induction- intravenous.    Postoperative Plan-         Informed Consent- Anesthetic plan and risks discussed with patient.  I personally reviewed this patient with the CRNA. Discussed and agreed on the Anesthesia Plan with the CRNA..      NPO Status:  Vitals Value Taken Time   Date of last liquid 04/01/25 04/01/25 0837   Time of last liquid 0400 04/01/25 0837   Date of last solid 03/30/25 04/01/25 0837   Time of last solid 1900 04/01/25 0837

## 2025-04-01 NOTE — H&P
[facility]  History & Physical    Patient Info:  Name: Bette Mckeon   Age: 54 y.o.   YOB: 1970   MRN: [id number]    HPI: Bette Mckeon is a 54 y.o. year old female who presents for history of colon polyps    REVIEW OF SYSTEMS: Per the HPI, and otherwise unremarkable.    Historical Information   Past Medical History:   Diagnosis Date    Allergic     Unknown when I went to the allergist.    Cataract 12/5/2024    Slight development of cataracts    Diabetes mellitus (HCC) 02/2011    PRE DM, DIET CONTROLLED    Fractures July 11th, 2023    Broke right foot    GERD (gastroesophageal reflux disease)     Unknown when I started my medicine.    Hypertension     Unknown when started with high blood pressure    Obesity     I'm currently doing weight watchers, but...    Stomach disorder     Acid Reflux    Varicella      Past Surgical History:   Procedure Laterality Date    COLONOSCOPY      KNEE ARTHROSCOPY W/ ACL RECONSTRUCTION AND EPIPHYSEAL HAMSTRING GRAFT  2013    KNEE SURGERY  December 17th, 2003    ACL reconstruction     Social History   Social History     Substance and Sexual Activity   Alcohol Use Yes    Comment: Maybe drink once a month     Social History     Substance and Sexual Activity   Drug Use No     Social History     Tobacco Use   Smoking Status Never   Smokeless Tobacco Never     Family History   Problem Relation Age of Onset    Breast cancer Mother 69        Stage 0, had radiation and was cured.    Cancer Mother         Breast, brain, liver and lung cancer    Hypertension Mother     Brain cancer Mother     Liver cancer Mother     Cancer Father         Skin cancer    Diabetes Father     Stroke Father         Minor stroke    Hypertension Father     Arthritis Father         He's 88 years old. Has arthritis in his back.    Hearing loss Father         He wears hearing aids    Diabetes Sister         Pre DM    Melanoma Sister     No Known Problems Sister     No  "Known Problems Sister     Hypertension Sister     Lung cancer Maternal Grandmother 64    Stomach cancer Maternal Grandfather     Stroke Paternal Grandmother         Minor stroke    Hypertension Paternal Grandmother     Liver cancer Paternal Grandfather     Lung cancer Paternal Grandfather 65    Cancer Paternal Grandfather         Liver cancer    Prostate cancer Maternal Uncle         He sought treatment and cured or controlled.    Colon cancer Neg Hx     Ovarian cancer Neg Hx     Uterine cancer Neg Hx        MEDICATIONS & ALLERGIES:    Current Outpatient Medications   Medication Instructions    amLODIPine (NORVASC) 10 mg, Oral, Daily    Azelastine HCl 137 MCG/SPRAY SOLN SPRAY 2 SPRAYS INTO EACH NOSTRIL 2 TIMES A DAY USE IN EACH NOSTRIL AS DIRECTED    cholecalciferol (VITAMIN D3) 1,000 Units, Oral, Daily    famotidine 20 mg/5 mL suspension     FLUoxetine (PROZAC) 20 mg, Oral, Daily    FLUoxetine (PROZAC) 10 mg, Oral, Daily    hydroCHLOROthiazide 12.5 mg, Oral, Daily    pantoprazole (PROTONIX) 20 mg, Oral, Daily     Allergies   Allergen Reactions    Dog Epithelium Hives    Dust Mite Extract Other (See Comments)    Mixed Ragweed Eye Swelling and Nasal Congestion       PHYSICAL EXAM:    Objective   Height 5' 1\" (1.549 m), weight 95.3 kg (210 lb). Body mass index is 39.68 kg/m².    Gen: NAD  CV: RRR  CHEST: Clear  ABD: Soft, NT/ND  EXT: No edema    ASSESSMENT AND PLAN:  This is a 54 y.o. year old female here for colonoscopy, and she is stable and optimized for her procedure.      Lisa Acuna D.O.  Gastroenterology Fellow  [facility]  Division of Gastroenterology & Hepatology  Available on Tiger[employer]    ** Please Note: This note is constructed using a voice recognition dictation system. **   "

## 2025-04-04 ENCOUNTER — RESULTS FOLLOW-UP (OUTPATIENT)
Dept: GASTROENTEROLOGY | Facility: CLINIC | Age: 55
End: 2025-04-04

## 2025-04-04 PROCEDURE — 88305 TISSUE EXAM BY PATHOLOGIST: CPT | Performed by: STUDENT IN AN ORGANIZED HEALTH CARE EDUCATION/TRAINING PROGRAM

## 2025-04-25 ENCOUNTER — OFFICE VISIT (OUTPATIENT)
Age: 55
End: 2025-04-25
Payer: COMMERCIAL

## 2025-04-25 VITALS
RESPIRATION RATE: 16 BRPM | BODY MASS INDEX: 38.33 KG/M2 | DIASTOLIC BLOOD PRESSURE: 80 MMHG | HEIGHT: 61 IN | TEMPERATURE: 97.9 F | HEART RATE: 84 BPM | WEIGHT: 203 LBS | SYSTOLIC BLOOD PRESSURE: 120 MMHG

## 2025-04-25 DIAGNOSIS — R73.03 PREDIABETES: ICD-10-CM

## 2025-04-25 DIAGNOSIS — E66.812 OBESITY, CLASS II, BMI 35-39.9: Primary | ICD-10-CM

## 2025-04-25 PROCEDURE — 99213 OFFICE O/P EST LOW 20 MIN: CPT | Performed by: PHYSICIAN ASSISTANT

## 2025-04-25 NOTE — PROGRESS NOTES
Assessment/Plan:  Bette was seen today for follow-up.    Diagnoses and all orders for this visit:    Obesity, Class II, BMI 35-39.9    Prediabetes         Initial: 220 lbs (12/11/24)  Current: 203 lbs BMI 38.36 (4/25/25)  Change: -17 lbs    - Weight not at goal  - Patient is interested in Conservative Program  - Labs reviewed: As below.    General Recommendations:  Nutrition:  Eat breakfast daily.  Do not skip meals.     Food log (ie.) www.myfitnesspal.com, sparkpeople.com, loseit.com, calorieking.com, etc.    Practice mindful eating.  Be sure to set aside time to eat, eat slowly, and savor your food.    Hydration:    At least 64oz of water daily.  No sugar sweetened beverages.  No juice (eat the fruit instead).    Exercise:  Studies have shown that the ideal exercise goal is somewhere between 150 to 300 minutes of moderate intensity exercise a week.  Start with exercising 10 minutes every other day and gradually increase physical activity with a goal of at least 150 minutes of moderate intensity exercise a week, divided over at least 3 days a week.  An example of this would be exercising 30 minutes a day, 5 days a week.  Resistance training can increase muscle mass and increase our resting metabolic rate.   FULL BODY resistance training is recommended 2-3 times a week.  Do not do this on consecutive days to allow for muscle recovery.    Aim for a bare minimum 5000 steps, even on days you do not exercise.    Monitoring:   Weigh yourself daily.  If this causes undue stress, then just weigh yourself once a week.  Weigh yourself the same time of the day with the same amount of clothing on.  Preferably this should be done after waking up, before you eat, and with no clothing or minimal clothing on.    Calorie goal:  9394-3818 sanam/day (Provided with meal plan to follow).    Return visit:    Calorie tracking/deficit - keep up the good work!  Physical activity goals - postprandial walking reviewed  AOM   Contraception:  menopause  GLP1 RA not covered by insurance. No ALBERT, prior CVA/MI  Discussed phentermine or contrave. Patient will hold off at this time  RTC: 4-6 months  Keep up the good work!!      Total time spent reviewing chart, interviewing patient, examining patient, discussing plan, answering all questions, and documentin min.       ______________________________________________________________________        Subjective:   Chief Complaint   Patient presents with    Follow-up     MWM- F/u; Waist-40in       HPI: Bette Mckeon  is a 54 y.o. female with history of HTN, hypothyroidism, depression, prediabetes, and excess weight, here to pursue weight loss management.  Previous notes and records have been reviewed.    Hemoglobin A1c 5.6 (24)  TSH WNL    HTN - amlodipine, HCTZ  Depression - prozac  GERD - pantoprazole, famotidine    Weight has been an issue over the past 15 years.  Patient went into menopause 2 years.    Diets - weight watchers (lost some weight but regained thereafter)    Zepbound prescribed 2024 however denied as a plan exclusion. Alternatives of phentermine or contrave was reviewed with the patient.     Over the past 4 months she has been calorie counting (1009-6514 sanam/day) + walking more. She is averaging around 1lb loss per week.     HPI  Wt Readings from Last 20 Encounters:   25 92.1 kg (203 lb)   25 95.3 kg (210 lb)   25 96.2 kg (212 lb 1.3 oz)   25 96.2 kg (212 lb)   24 101 kg (222 lb)   24 100 kg (220 lb 12.8 oz)   24 102 kg (224 lb)   24 99.9 kg (220 lb 3.2 oz)   24 99.8 kg (220 lb)   08/15/24 99.1 kg (218 lb 6.4 oz)   24 98.7 kg (217 lb 9.6 oz)   24 96.1 kg (211 lb 12.8 oz)   24 93.9 kg (207 lb)   24 90.4 kg (199 lb 4.7 oz)   23 90.4 kg (199 lb 6.4 oz)   11/15/23 93.7 kg (206 lb 9.6 oz)   23 90.3 kg (199 lb)   23 90.7 kg (200 lb)   23 90.7 kg (200 lb)   23 90.7 kg (200 lb)  "      Food logging:  B: coffee 2 cups + eggwhite omelette, with parmesan cheese   S: skip  L: leftover, pasta/mac n cheese  S: skip  D: protein (chicken, post roast)  + veggie (not much, carrots, peppers, green beans) + starch (potatoes) . Spaghetti, ordering out, steak + baked potato  S: chips or cheetos        Dining out: 3 nights/week   Hydration: 2 cups coffee + silk cream    Water - 4 large glasses    Soda - diet coke or regular coke \     Occasional OJ  Alcohol: \"Rare\" 0-1 drink/month     Exercise: Walking - 3-4 x/week. 30 minutes.     Still gets some right foot pain from prior break.          Past Medical History:   Diagnosis Date    Allergic     Unknown when I went to the allergist.    Cataract 12/5/2024    Slight development of cataracts    Diabetes mellitus (HCC) 02/2011    PRE DM, DIET CONTROLLED    Fractures July 11th, 2023    Broke right foot    GERD (gastroesophageal reflux disease)     Unknown when I started my medicine.    Hypertension     Unknown when started with high blood pressure    Obesity     I'm currently doing weight watchers, but...    Stomach disorder     Acid Reflux    Varicella      Patient denies personal and family history of  pancreatitis, thyroid cancer, MEN-2 tumors.  Denies any hx of glaucoma, seizures, kidney stones, gallstones.  Denies Hx of CAD, PAD, palpitations, arrhythmia.   Denies uncontrolled anxiety or depression, suicidal behavior or thinking , insomnia or sleep disturbance.     Past Surgical History:   Procedure Laterality Date    COLONOSCOPY      KNEE ARTHROSCOPY W/ ACL RECONSTRUCTION AND EPIPHYSEAL HAMSTRING GRAFT  2013    KNEE SURGERY  December 17th, 2003    ACL reconstruction     The following portions of the patient's history were reviewed and updated as appropriate: allergies, current medications, past family history, past medical history, past social history, past surgical history, and problem list.    Review Of Systems:  Review of Systems   Constitutional:  " "Negative for fatigue and fever.   HENT:  Negative for sore throat, trouble swallowing and voice change.    Respiratory:  Negative for shortness of breath.    Cardiovascular:  Negative for chest pain.   Gastrointestinal:  Negative for abdominal pain, constipation, diarrhea, nausea and vomiting.   Endocrine: Negative for cold intolerance and heat intolerance.   Genitourinary:  Negative for difficulty urinating.   Musculoskeletal:  Negative for arthralgias and back pain.   Psychiatric/Behavioral:  Negative for suicidal ideas. The patient is not nervous/anxious.    All other systems reviewed and are negative.      Objective:  /80 (BP Location: Left arm, Patient Position: Sitting)   Pulse 84   Temp 97.9 °F (36.6 °C) (Tympanic)   Resp 16   Ht 5' 1\" (1.549 m)   Wt 92.1 kg (203 lb)   BMI 38.36 kg/m²   Physical Exam  Vitals and nursing note reviewed.   Constitutional:       General: She is not in acute distress.     Appearance: Normal appearance. She is obese. She is not ill-appearing, toxic-appearing or diaphoretic.   HENT:      Head: Normocephalic and atraumatic.   Eyes:      General:         Right eye: No discharge.         Left eye: No discharge.      Conjunctiva/sclera: Conjunctivae normal.   Pulmonary:      Effort: Pulmonary effort is normal. No respiratory distress.   Musculoskeletal:         General: Normal range of motion.      Cervical back: Normal range of motion. No rigidity.      Right lower leg: No edema.      Left lower leg: No edema.   Skin:     Coloration: Skin is not pale.      Findings: No erythema or rash.   Neurological:      General: No focal deficit present.      Mental Status: She is alert.   Psychiatric:         Mood and Affect: Mood normal.         Labs and Imaging  Recent labs and imaging have been personally reviewed.  Lab Results   Component Value Date    WBC 6.24 09/07/2024    HGB 13.6 09/07/2024    HCT 42.2 09/07/2024    MCV 84 09/07/2024     09/07/2024     Lab Results "   Component Value Date    SODIUM 141 03/02/2024    K 4.3 03/02/2024     03/02/2024    CO2 31 03/02/2024    AGAP 9 03/02/2024    BUN 16 03/02/2024    CREATININE 0.69 03/02/2024    GLUF 91 03/02/2024    CALCIUM 9.4 03/02/2024    AST 27 03/02/2024    ALT 37 03/02/2024    ALKPHOS 76 03/02/2024    TP 7.3 03/02/2024    TBILI 0.65 03/02/2024    EGFR 99 03/02/2024     Lab Results   Component Value Date    HGBA1C 5.6 09/07/2024     Lab Results   Component Value Date    KXL0LHWUUDGE 3.057 09/07/2024     Lab Results   Component Value Date    CHOLESTEROL 181 09/07/2024     Lab Results   Component Value Date    HDL 43 (L) 09/07/2024     Lab Results   Component Value Date    TRIG 147 09/07/2024     Lab Results   Component Value Date    LDLCALC 109 (H) 09/07/2024

## 2025-05-05 ENCOUNTER — DOCUMENTATION (OUTPATIENT)
Dept: GENETICS | Facility: CLINIC | Age: 55
End: 2025-05-05

## 2025-05-07 ENCOUNTER — CONSULT (OUTPATIENT)
Dept: GENETICS | Facility: CLINIC | Age: 55
End: 2025-05-07
Payer: COMMERCIAL

## 2025-05-07 DIAGNOSIS — Z80.0 FAMILY HISTORY OF GI TRACT CANCER: ICD-10-CM

## 2025-05-07 DIAGNOSIS — Z80.3 FAMILY HISTORY OF BREAST CANCER IN MOTHER: Primary | ICD-10-CM

## 2025-05-07 DIAGNOSIS — Z80.42 FAMILY HISTORY OF PROSTATE CANCER: ICD-10-CM

## 2025-05-07 DIAGNOSIS — Z80.8 FAMILY HISTORY OF MELANOMA: ICD-10-CM

## 2025-05-07 PROCEDURE — 96041 GENETIC COUNSELING SVC EA 30: CPT | Performed by: GENETIC COUNSELOR, MS

## 2025-05-07 PROCEDURE — 36415 COLL VENOUS BLD VENIPUNCTURE: CPT

## 2025-05-07 NOTE — PROGRESS NOTES
Pre-Test Genetic Counseling Consult Note    Patient Name: Bette Mckeon   /Age: [birth year]//54 y.o.  Referring Provider: RACHNA Jeong     Date of Service: 2025  Genetic Counselor: Nithya Arauz [place], [facility] [place]pretation Services: None  Location: In-person consult at [place]  Length of Visit: 60 Minutes    Bette was referred to the [facility] Cancer Risk and Genetic Assessment Program due to her family history of melanoma, breast, prostate and stomach cancer among others . she presents today to discuss the possibility of a hereditary cancer syndrome, options for genetic testing, and implications for her and her family.     Cancer History and Treatment:     Personal History: No personal history of cancer       Screening Hx:     Breast:  Breast Imaging: Annual mammogram   Breast Biopsy: No prior breast biopsy   Breast Density: Scattered fibroglandular density     Colon:  Colonoscopy (most recent 25) 3 polyps found, last colonoscopy was Aug 2019 and 2 polyps found; repeats colonoscopy every 5 years     Final Diagnosis   A. Polyp, Colorectal, sigmoid - cold snare:  -   Tubular adenoma in fragments.  -   Negative for high grade dysplasia and carcinoma.  -   Single fragment of hyperplastic polyp.     Gynecologic:  Ovaries and Uterus intact     Skin:  No current screening    Reproductive History  Age at menarche: 12  Age at first live birth: Nulliparous  Menopause: Post Menopausal (51)  Hormone replacement: NA     Medical and Surgical History  Pertinent surgical history:   Past Surgical History:   Procedure Laterality Date    COLONOSCOPY      KNEE ARTHROSCOPY W/ ACL RECONSTRUCTION AND EPIPHYSEAL HAMSTRING GRAFT      KNEE SURGERY  2003    ACL reconstruction      Pertinent medical history:  Past Medical History:   Diagnosis Date    Allergic     Unknown when I went to the allergist.    Cataract 2024    Slight development of cataracts    Diabetes mellitus (HCC) 2011    PRE  "DM, DIET CONTROLLED    Fractures July 11th, 2023    Broke right foot    GERD (gastroesophageal reflux disease)     Unknown when I started my medicine.    Hypertension     Unknown when started with high blood pressure    Obesity     I'm currently doing weight watchers, but...    Stomach disorder     Acid Reflux    Varicella        Other History:  Height:   Ht Readings from Last 1 Encounters:   04/25/25 5' 1\" (1.549 m)     Weight:   Wt Readings from Last 1 Encounters:   04/25/25 92.1 kg (203 lb)     Relevant Family History   Patient reports no Ashkenazi Baptist ancestry.     Note: Bette's sister, who was diagnosed with melanoma, reportedly had genetic testing however the result of this test is not known.  She did have a prophylactic hysterectomy and is having increased breast cancer screening following this result.             Please refer to the scanned pedigree in the Media Tab for a complete family history     *All history is reported as provided by the patient; records are not available for review, except where indicated.     Assessment:  We discussed sporadic, familial and hereditary cancer.  We also discussed the many factors that influence our risk for cancer such as age, environmental exposures, lifestyle choices and family history.      We reviewed the indications suggestive of a hereditary predisposition to cancer.  Technically, Bette does not meet NCCN testing criteira based on the reported family history of cancer however she has a family history of melanoma, breast and prostate cancer which are all BRCA2-related cancers.  So although the ages of diagnosis do not meet NCCN testing criteria, genetic testing may be considered to rule out moderately penetrant genes which have clear management recommendations.      The risks, benefits, and limitations of genetic testing were reviewed with the patient, as well as genetic discrimination laws, and possible test results (positive, negative, variants of " uncertain significance) and their clinical implications. If positive for a mutation, options for managing cancer risk including increased surveillance, chemoprevention, and in some cases prophylactic surgery were discussed. Bette was informed that if a hereditary cancer syndrome was identified in her, first degree relatives (parents, siblings, and children) have a chance of also inheriting the condition. Genetic testing would allow for predictive genetic testing in other relatives, who may also be at risk depending on their degree of relation.     Billing:  Most individuals pay <$100 for hereditary cancer genetic testing. If insurance covers the cost of the testing, individuals may still pay out of pocket secondary to co-pays, co-insurance, or deductibles. If the cost of the testing exceeds $100, the lab will reach out to the patient via phone or e-mail. The patient will then have the option to proceed with the testing, cancel the testing, or elect the self-pay option of $250. Bette verbalized understanding.     Plan: Patient decided to proceed with testing and provided consent.    Summary:     Sample Collection:  The patient's blood sample was drawn in the office on 5/7/25 by genetic counseling assistant Karan Escobar    Genetic Testing Performed: [facility]-Expanded + RNA (77 genes): AIP, ALK, APC, CORDELL, AXIN2, BAP1, BARD1, BMPR1A, BRCA1, BRCA2, BRIP1, CDC73, CDH1, CDK4, CDKN1B, CDKN2A, CEBPA, CHEK2, CTNNA1, DDX41, DICER1, EGFR, EPCAM, ETV6, FH, FLCN, GATA2, GREM1, HOXB13, KIT, LZTR1, MAX, MBD4, MEN1, MET, MITF, MLH1, MSH2, MSH3, MSH6, MUTYH, NF1, NF2, NTHL1, PALB2, PDGFRA, PHOX2B, PMS2, POLD1, POLE, POT1, RJHEJ3G, PTCH1, PTEN, RAD51C, RAD51D, RB1, RET, RPS20, RUNX1, SDHA, SDHAF2, SDHB, SDHC, SDHD, SMAD4, SMARCA4, SMARCB1, SMARCE1, STK11, SUFU, FJOK141, TP53, TSC1, TSC2, VHL, WT1     Result Call Information:  In the event that we need to reach Bette via telephone:  I confirmed the patient's mobile number on  file as the best number to call with results  I confirmed with the patient that we can leave a voicemail on the provided numbers    Results take approximately 2-3 weeks to complete once test is started.    Bette will be notified via [employer] once results are available.      Additional recommendations for surveillance/medical management will be made pending genetic test results.

## 2025-05-23 LAB
GENE DIS ANL INTERP-IMP: NORMAL
INTERPRETATION: NORMAL

## 2025-05-27 DIAGNOSIS — K21.9 GASTROESOPHAGEAL REFLUX DISEASE, UNSPECIFIED WHETHER ESOPHAGITIS PRESENT: ICD-10-CM

## 2025-05-28 RX ORDER — PANTOPRAZOLE SODIUM 20 MG/1
20 TABLET, DELAYED RELEASE ORAL DAILY
Qty: 90 TABLET | Refills: 1 | Status: SHIPPED | OUTPATIENT
Start: 2025-05-28

## 2025-06-02 DIAGNOSIS — F41.9 ANXIETY: ICD-10-CM

## 2025-06-02 RX ORDER — FLUOXETINE 10 MG/1
10 CAPSULE ORAL DAILY
Qty: 90 CAPSULE | Refills: 1 | Status: SHIPPED | OUTPATIENT
Start: 2025-06-02 | End: 2025-06-09

## 2025-06-05 DIAGNOSIS — I10 PRIMARY HYPERTENSION: ICD-10-CM

## 2025-06-05 RX ORDER — AMLODIPINE BESYLATE 10 MG/1
10 TABLET ORAL DAILY
Qty: 90 TABLET | Refills: 1 | Status: SHIPPED | OUTPATIENT
Start: 2025-06-05

## 2025-06-06 ENCOUNTER — RESULTS FOLLOW-UP (OUTPATIENT)
Dept: GENETICS | Facility: CLINIC | Age: 55
End: 2025-06-06

## 2025-06-09 ENCOUNTER — OFFICE VISIT (OUTPATIENT)
Dept: FAMILY MEDICINE CLINIC | Facility: CLINIC | Age: 55
End: 2025-06-09
Payer: COMMERCIAL

## 2025-06-09 VITALS
DIASTOLIC BLOOD PRESSURE: 82 MMHG | WEIGHT: 203 LBS | BODY MASS INDEX: 38.33 KG/M2 | OXYGEN SATURATION: 97 % | HEIGHT: 61 IN | TEMPERATURE: 97 F | SYSTOLIC BLOOD PRESSURE: 122 MMHG | HEART RATE: 73 BPM

## 2025-06-09 DIAGNOSIS — R06.83 LOUD SNORING: ICD-10-CM

## 2025-06-09 DIAGNOSIS — E66.9 OBESITY (BMI 30-39.9): ICD-10-CM

## 2025-06-09 DIAGNOSIS — Z00.00 ANNUAL PHYSICAL EXAM: Primary | ICD-10-CM

## 2025-06-09 DIAGNOSIS — Z23 ENCOUNTER FOR IMMUNIZATION: ICD-10-CM

## 2025-06-09 DIAGNOSIS — R73.03 PREDIABETES: ICD-10-CM

## 2025-06-09 DIAGNOSIS — F41.9 ANXIETY: ICD-10-CM

## 2025-06-09 PROCEDURE — 90471 IMMUNIZATION ADMIN: CPT

## 2025-06-09 PROCEDURE — 90750 HZV VACC RECOMBINANT IM: CPT

## 2025-06-09 PROCEDURE — 99396 PREV VISIT EST AGE 40-64: CPT | Performed by: FAMILY MEDICINE

## 2025-06-09 RX ORDER — FLUOXETINE HYDROCHLORIDE 40 MG/1
40 CAPSULE ORAL DAILY
Qty: 90 CAPSULE | Refills: 3 | Status: SHIPPED | OUTPATIENT
Start: 2025-06-09

## 2025-06-09 NOTE — ASSESSMENT & PLAN NOTE
No significant improvement in anxiety symptoms.  Recommend increased dosage on Prozac to 40 mg daily.  Recommend follow-up in 6 months or sooner as needed.    Orders:  •  FLUoxetine (PROzac) 40 MG capsule; Take 1 capsule (40 mg total) by mouth daily  •  CBC and differential; Future  •  Comprehensive metabolic panel  •  TSH, 3rd generation with Free T4 reflex; Future

## 2025-06-09 NOTE — PATIENT INSTRUCTIONS
"Patient Education     Routine physical for adults   The Basics   Written by the doctors and editors at [facility]   What is a physical? -- A physical is a routine visit, or \"check-up,\" with your doctor. You might also hear it called a \"wellness visit\" or \"preventive visit.\"  During each visit, the doctor will:   Ask about your physical and mental health   Ask about your habits, behaviors, and lifestyle   Do an exam   Give you vaccines if needed   Talk to you about any medicines you take   Give advice about your health   Answer your questions  Getting regular check-ups is an important part of taking care of your health. It can help your doctor find and treat any problems you have. But it's also important for preventing health problems.  A routine physical is different from a \"sick visit.\" A sick visit is when you see a doctor because of a health concern or problem. Since physicals are scheduled ahead of time, you can think about what you want to ask the doctor.  How often should I get a physical? -- It depends on your age and health. In general, for people age 21 years and older:   If you are younger than 50 years, you might be able to get a physical every 3 years.   If you are 50 years or older, your doctor might recommend a physical every year.  If you have an ongoing health condition, like diabetes or high blood pressure, your doctor will probably want to see you more often.  What happens during a physical? -- In general, each visit will include:   Physical exam - The doctor or nurse will check your height, weight, heart rate, and blood pressure. They will also look at your eyes and ears. They will ask about how you are feeling and whether you have any symptoms that bother you.   Medicines - It's a good idea to bring a list of all the medicines you take to each doctor visit. Your doctor will talk to you about your medicines and answer any questions. Tell them if you are having any side effects that bother you. You " "should also tell them if you are having trouble paying for any of your medicines.   Habits and behaviors - This includes:   Your diet   Your exercise habits   Whether you smoke, drink alcohol, or use drugs   Whether you are sexually active   Whether you feel safe at home  Your doctor will talk to you about things you can do to improve your health and lower your risk of health problems. They will also offer help and support. For example, if you want to quit smoking, they can give you advice and might prescribe medicines. If you want to improve your diet or get more physical activity, they can help you with this, too.   Lab tests, if needed - The tests you get will depend on your age and situation. For example, your doctor might want to check your:   Cholesterol   Blood sugar   Iron level   Vaccines - The recommended vaccines will depend on your age, health, and what vaccines you already had. Vaccines are very important because they can prevent certain serious or deadly infections.   Discussion of screening - \"Screening\" means checking for diseases or other health problems before they cause symptoms. Your doctor can recommend screening based on your age, risk, and preferences. This might include tests to check for:   Cancer, such as breast, prostate, cervical, ovarian, colorectal, prostate, lung, or skin cancer   Sexually transmitted infections, such as chlamydia and gonorrhea   Mental health conditions like depression and anxiety  Your doctor will talk to you about the different types of screening tests. They can help you decide which screenings to have. They can also explain what the results might mean.   Answering questions - The physical is a good time to ask the doctor or nurse questions about your health. If needed, they can refer you to other doctors or specialists, too.  Adults older than 65 years often need other care, too. As you get older, your doctor will talk to you about:   How to prevent falling at " home   Hearing or vision tests   Memory testing   How to take your medicines safely   Making sure that you have the help and support you need at home  All topics are updated as new evidence becomes available and our peer review process is complete.  This topic retrieved from [employer] on: May 02, 2024.  Topic 948881 Version 1.0  Release: 32.4.3 - C32.122  © 2024 UpToDate, Inc. and/or its affiliates. All rights reserved.  Consumer Information Use and Disclaimer   Disclaimer: This generalized information is a limited summary of diagnosis, treatment, and/or medication information. It is not meant to be comprehensive and should be used as a tool to help the user understand and/or assess potential diagnostic and treatment options. It does NOT include all information about conditions, treatments, medications, side effects, or risks that may apply to a specific patient. It is not intended to be medical advice or a substitute for the medical advice, diagnosis, or treatment of a health care provider based on the health care provider's examination and assessment of a patient's specific and unique circumstances. Patients must speak with a health care provider for complete information about their health, medical questions, and treatment options, including any risks or benefits regarding use of medications. This information does not endorse any treatments or medications as safe, effective, or approved for treating a specific patient. UpToDate, Inc. and its affiliates disclaim any warranty or liability relating to this information or the use thereof.The use of this information is governed by the Terms of Use, available at https://www.woltersRedfin Networkuwer.com/en/know/clinical-effectiveness-terms. 2024© UpToDate, Inc. and its affiliates and/or licensors. All rights reserved.  Copyright   © 2024 UpToDate, Inc. and/or its affiliates. All rights reserved.

## 2025-06-09 NOTE — PROGRESS NOTES
Adult Annual Physical  Name: Bette Mckeon      : 1970      MRN: [id number]  Encounter Provider: Flora Small DO  Encounter Date: 2025   Encounter department: [facility]    :  Assessment & Plan  Annual physical exam    Anticipatory guidance is discussed regarding preventative care.  Recommend laboratory workup in the setting of chronic conditions as noted below.  Recommend follow-up in 6 months or sooner as needed.         Loud snoring    Patient notes witnessed apneas, loud snoring, persistent fatigue.  She does sometimes have morning headaches.  Recommend referral for home sleep study for further evaluation.    Orders:  •  Ambulatory Referral to Sleep Medicine; Future  •  CBC and differential; Future  •  Comprehensive metabolic panel  •  TSH, 3rd generation with Free T4 reflex; Future    Anxiety    No significant improvement in anxiety symptoms.  Recommend increased dosage on Prozac to 40 mg daily.  Recommend follow-up in 6 months or sooner as needed.    Orders:  •  FLUoxetine (PROzac) 40 MG capsule; Take 1 capsule (40 mg total) by mouth daily  •  CBC and differential; Future  •  Comprehensive metabolic panel  •  TSH, 3rd generation with Free T4 reflex; Future    Prediabetes    Orders:  •  Lipid panel; Future  •  Hemoglobin A1C; Future    Obesity (BMI 30-39.9)      Orders:  •  TSH, 3rd generation with Free T4 reflex; Future  •  Lipid panel; Future  •  Hemoglobin A1C; Future    Encounter for immunization    Orders:  •  Zoster Vaccine Recombinant IM    Annual physical exam         Annual physical exam               Preventive Screenings:  - Diabetes Screening: screening up-to-date  - Hepatitis C screening: screening up-to-date   - HIV screening: screening up-to-date   - Cervical cancer screening: screening up-to-date   - Breast cancer screening: screening up-to-date   - Colon cancer screening: screening up-to-date   - Lung cancer screening: screening not indicated  "    Immunizations:  - Immunizations due: Prevnar 20 and Tdap      Depression Screening and Follow-up Plan: Patient was screened for depression during today's encounter. They screened negative with a PHQ-9 score of 2.          History of Present Illness     Adult Annual Physical:  Patient presents for annual physical. Still feeling anxious. Pretty consistent. No significant difference with the increased dose. .     Diet and Physical Activity:  - Diet/Nutrition: low calorie diet. [facility]'s weight Management has me on a 1400 to 1600 calorie diet  - Exercise: walking, 5-7 times a week on average and less than 30 minutes on average.    Depression Screening:    - PHQ-9 Score: 2    General Health:  - Sleep: 7-8 hours of sleep on average and snores loudly. My sister's think i have sleep Apnea.  - Hearing: normal hearing bilateral ears.  - Vision: wears glasses and contacts. I go for my yearly eye exams  - Dental: regular dental visits, brushes teeth once daily and brushes teeth twice daily. Sometimes i brush 2x a day    /GYN Health:  - Follows with GYN: yes.   - Menopause: postmenopausal.   - Last menstrual cycle: 4/18/2022.   - History of STDs: no  - Contraception: menopause.      Advanced Care Planning:  - Has an advanced directive?: no    - Has a durable medical POA?: no      Review of Systems   Constitutional:  Negative for chills and fever.   HENT:  Negative for ear pain, postnasal drip, rhinorrhea and sore throat.    Respiratory:  Negative for cough, shortness of breath and wheezing.    Cardiovascular:  Negative for chest pain.   Musculoskeletal:  Negative for myalgias.   Skin:  Negative for rash.   Neurological:  Negative for headaches.   Psychiatric/Behavioral:  The patient is nervous/anxious.          Objective   /82 (BP Location: Left arm, Patient Position: Sitting, Cuff Size: Standard)   Pulse 73   Temp (!) 97 °F (36.1 °C) (Tympanic)   Ht 5' 1\" (1.549 m)   Wt 92.1 kg (203 lb)   LMP 04/18/2022   " SpO2 97%   BMI 38.36 kg/m²     Physical Exam  Vitals reviewed.   Constitutional:       General: She is not in acute distress.     Appearance: She is well-developed.   HENT:      Head: Normocephalic and atraumatic.      Right Ear: Tympanic membrane, ear canal and external ear normal.      Left Ear: Tympanic membrane, ear canal and external ear normal.      Nose: Nose normal.      Mouth/Throat:      Mouth: Mucous membranes are moist.      Pharynx: Oropharynx is clear.     Eyes:      Conjunctiva/sclera: Conjunctivae normal.      Pupils: Pupils are equal, round, and reactive to light.       Cardiovascular:      Rate and Rhythm: Normal rate and regular rhythm.      Heart sounds: No murmur heard.  Pulmonary:      Effort: Pulmonary effort is normal. No respiratory distress.      Breath sounds: Normal breath sounds.   Abdominal:      Palpations: Abdomen is soft.      Tenderness: There is no abdominal tenderness.     Musculoskeletal:      Cervical back: Neck supple.      Right lower leg: No edema.      Left lower leg: No edema.   Lymphadenopathy:      Cervical: No cervical adenopathy.     Skin:     General: Skin is warm and dry.     Neurological:      General: No focal deficit present.      Mental Status: She is alert and oriented to person, place, and time.     Psychiatric:         Mood and Affect: Mood normal.         Behavior: Behavior normal.

## 2025-06-11 ENCOUNTER — TRANSCRIBE ORDERS (OUTPATIENT)
Dept: SLEEP CENTER | Facility: CLINIC | Age: 55
End: 2025-06-11

## 2025-06-11 DIAGNOSIS — R06.83 SNORING: Primary | ICD-10-CM

## 2025-06-12 NOTE — ASSESSMENT & PLAN NOTE
Orders:  •  TSH, 3rd generation with Free T4 reflex; Future  •  Lipid panel; Future  •  Hemoglobin A1C; Future

## 2025-06-12 NOTE — ASSESSMENT & PLAN NOTE
Orders:  •  Lipid panel; Future  •  Hemoglobin A1C; Future   (0) understands/communicates without difficulty

## 2025-06-14 ENCOUNTER — APPOINTMENT (OUTPATIENT)
Dept: LAB | Age: 55
End: 2025-06-14
Payer: COMMERCIAL

## 2025-06-14 DIAGNOSIS — F41.9 ANXIETY: ICD-10-CM

## 2025-06-14 DIAGNOSIS — R06.83 LOUD SNORING: ICD-10-CM

## 2025-06-14 DIAGNOSIS — E66.9 OBESITY (BMI 30-39.9): ICD-10-CM

## 2025-06-14 DIAGNOSIS — R73.03 PREDIABETES: ICD-10-CM

## 2025-06-14 LAB
ALBUMIN SERPL BCG-MCNC: 4.3 G/DL (ref 3.5–5)
ALP SERPL-CCNC: 95 U/L (ref 34–104)
ALT SERPL W P-5'-P-CCNC: 28 U/L (ref 7–52)
ANION GAP SERPL CALCULATED.3IONS-SCNC: 9 MMOL/L (ref 4–13)
AST SERPL W P-5'-P-CCNC: 22 U/L (ref 13–39)
BASOPHILS # BLD AUTO: 0.06 THOUSANDS/ÂΜL (ref 0–0.1)
BASOPHILS NFR BLD AUTO: 1 % (ref 0–1)
BILIRUB SERPL-MCNC: 0.51 MG/DL (ref 0.2–1)
BUN SERPL-MCNC: 14 MG/DL (ref 5–25)
CALCIUM SERPL-MCNC: 9.4 MG/DL (ref 8.4–10.2)
CHLORIDE SERPL-SCNC: 101 MMOL/L (ref 96–108)
CHOLEST SERPL-MCNC: 173 MG/DL (ref ?–200)
CO2 SERPL-SCNC: 31 MMOL/L (ref 21–32)
CREAT SERPL-MCNC: 0.62 MG/DL (ref 0.6–1.3)
EOSINOPHIL # BLD AUTO: 0.14 THOUSAND/ÂΜL (ref 0–0.61)
EOSINOPHIL NFR BLD AUTO: 2 % (ref 0–6)
ERYTHROCYTE [DISTWIDTH] IN BLOOD BY AUTOMATED COUNT: 13.4 % (ref 11.6–15.1)
EST. AVERAGE GLUCOSE BLD GHB EST-MCNC: 114 MG/DL
GFR SERPL CREATININE-BSD FRML MDRD: 101 ML/MIN/1.73SQ M
GLUCOSE P FAST SERPL-MCNC: 91 MG/DL (ref 65–99)
HBA1C MFR BLD: 5.6 %
HCT VFR BLD AUTO: 42 % (ref 34.8–46.1)
HDLC SERPL-MCNC: 37 MG/DL
HGB BLD-MCNC: 13.7 G/DL (ref 11.5–15.4)
IMM GRANULOCYTES # BLD AUTO: 0.01 THOUSAND/UL (ref 0–0.2)
IMM GRANULOCYTES NFR BLD AUTO: 0 % (ref 0–2)
LDLC SERPL CALC-MCNC: 106 MG/DL (ref 0–100)
LYMPHOCYTES # BLD AUTO: 2.01 THOUSANDS/ÂΜL (ref 0.6–4.47)
LYMPHOCYTES NFR BLD AUTO: 30 % (ref 14–44)
MCH RBC QN AUTO: 27.3 PG (ref 26.8–34.3)
MCHC RBC AUTO-ENTMCNC: 32.6 G/DL (ref 31.4–37.4)
MCV RBC AUTO: 84 FL (ref 82–98)
MONOCYTES # BLD AUTO: 0.49 THOUSAND/ÂΜL (ref 0.17–1.22)
MONOCYTES NFR BLD AUTO: 7 % (ref 4–12)
NEUTROPHILS # BLD AUTO: 3.94 THOUSANDS/ÂΜL (ref 1.85–7.62)
NEUTS SEG NFR BLD AUTO: 60 % (ref 43–75)
NONHDLC SERPL-MCNC: 136 MG/DL
NRBC BLD AUTO-RTO: 0 /100 WBCS
PLATELET # BLD AUTO: 364 THOUSANDS/UL (ref 149–390)
PMV BLD AUTO: 9.8 FL (ref 8.9–12.7)
POTASSIUM SERPL-SCNC: 3.9 MMOL/L (ref 3.5–5.3)
PROT SERPL-MCNC: 7.7 G/DL (ref 6.4–8.4)
RBC # BLD AUTO: 5.01 MILLION/UL (ref 3.81–5.12)
SODIUM SERPL-SCNC: 141 MMOL/L (ref 135–147)
TRIGL SERPL-MCNC: 151 MG/DL (ref ?–150)
TSH SERPL DL<=0.05 MIU/L-ACNC: 3.6 UIU/ML (ref 0.45–4.5)
WBC # BLD AUTO: 6.65 THOUSAND/UL (ref 4.31–10.16)

## 2025-06-14 PROCEDURE — 83036 HEMOGLOBIN GLYCOSYLATED A1C: CPT

## 2025-06-14 PROCEDURE — 84443 ASSAY THYROID STIM HORMONE: CPT

## 2025-06-14 PROCEDURE — 80061 LIPID PANEL: CPT

## 2025-06-14 PROCEDURE — 36415 COLL VENOUS BLD VENIPUNCTURE: CPT | Performed by: FAMILY MEDICINE

## 2025-06-14 PROCEDURE — 85025 COMPLETE CBC W/AUTO DIFF WBC: CPT

## 2025-06-14 PROCEDURE — 80053 COMPREHEN METABOLIC PANEL: CPT | Performed by: FAMILY MEDICINE

## 2025-06-18 ENCOUNTER — RESULTS FOLLOW-UP (OUTPATIENT)
Dept: FAMILY MEDICINE CLINIC | Facility: CLINIC | Age: 55
End: 2025-06-18

## 2025-07-10 ENCOUNTER — HOSPITAL ENCOUNTER (OUTPATIENT)
Dept: SLEEP CENTER | Facility: CLINIC | Age: 55
Discharge: HOME | End: 2025-07-10
Attending: FAMILY MEDICINE

## 2025-07-10 DIAGNOSIS — R06.83 SNORING: ICD-10-CM

## 2025-07-10 NOTE — PROGRESS NOTES
Home Sleep Study Documentation    HOME STUDY DEVICE: Noxturnal no                                           Nia G3 yes device # 5      Pre-Sleep Home Study:    Set-up and instructions performed by: Margarita    Technician performed demonstration for Patient: yes    Return demonstration performed by Patient: yes    Written instructions provided to Patient: yes    Patient signed consent form: yes          Post-Sleep Home Study:    Post Test Questionnaire Data: Pending    Additional comments by Patient: pending    Home Sleep Study Failed:pending    Failure reason: pending    Reported or Detected: pending    Scored by: pending

## 2025-07-21 PROBLEM — G47.33 OSA (OBSTRUCTIVE SLEEP APNEA): Status: ACTIVE | Noted: 2025-07-21

## 2025-07-23 ENCOUNTER — DOCUMENTATION (OUTPATIENT)
Dept: SLEEP CENTER | Facility: CLINIC | Age: 55
End: 2025-07-23

## 2025-07-23 NOTE — PROGRESS NOTES
Patient recently completed a home sleep study which revealed moderate obstructive sleep apnea.    I have messaged the ordering clinician regarding sleep study results.

## 2025-07-26 ENCOUNTER — RESULTS FOLLOW-UP (OUTPATIENT)
Dept: FAMILY MEDICINE CLINIC | Facility: CLINIC | Age: 55
End: 2025-07-26

## 2025-07-26 DIAGNOSIS — G47.33 OSA (OBSTRUCTIVE SLEEP APNEA): Primary | ICD-10-CM

## 2025-08-19 DIAGNOSIS — I10 PRIMARY HYPERTENSION: ICD-10-CM

## 2025-08-20 RX ORDER — HYDROCHLOROTHIAZIDE 12.5 MG/1
12.5 TABLET ORAL DAILY
Qty: 90 TABLET | Refills: 1 | Status: SHIPPED | OUTPATIENT
Start: 2025-08-20